# Patient Record
Sex: FEMALE | Race: WHITE | NOT HISPANIC OR LATINO | URBAN - METROPOLITAN AREA
[De-identification: names, ages, dates, MRNs, and addresses within clinical notes are randomized per-mention and may not be internally consistent; named-entity substitution may affect disease eponyms.]

---

## 2019-01-29 ENCOUNTER — EMERGENCY (EMERGENCY)
Facility: HOSPITAL | Age: 79
LOS: 1 days | Discharge: HOME | End: 2019-01-29
Attending: EMERGENCY MEDICINE | Admitting: EMERGENCY MEDICINE
Payer: MEDICARE

## 2019-01-29 VITALS
RESPIRATION RATE: 16 BRPM | SYSTOLIC BLOOD PRESSURE: 100 MMHG | OXYGEN SATURATION: 96 % | TEMPERATURE: 98 F | DIASTOLIC BLOOD PRESSURE: 66 MMHG | HEART RATE: 92 BPM

## 2019-01-29 PROCEDURE — 99282 EMERGENCY DEPT VISIT SF MDM: CPT | Mod: GC

## 2019-01-29 NOTE — ED PROVIDER NOTE - MEDICAL DECISION MAKING DETAILS
Normal eye exam; ddx dry eyes vs. marni-eye irritation; no corneal abrasions or foreign bodies appreciated, clinically not orbital cellulitis; no periorbital edema, systemic symptoms, periorbital tenderness, vision WNL, EOMI, SYLWIA, no significant erythema

## 2019-01-29 NOTE — ED PROVIDER NOTE - PHYSICAL EXAMINATION
Eyes: 20/20 vision b/l, EOMI, SYLWIA; no fluorescein uptake on exam; skin around eyes mildly red, left greater than right

## 2019-01-29 NOTE — ED ADULT NURSE NOTE - NSIMPLEMENTINTERV_GEN_ALL_ED
Implemented All Universal Safety Interventions:  [place] to call system. Call bell, personal items and telephone within reach. Instruct patient to call for assistance. Room bathroom lighting operational. Non-slip footwear when patient is off stretcher. Physically safe environment: no spills, clutter or unnecessary equipment. Stretcher in lowest position, wheels locked, appropriate side rails in place.

## 2019-01-29 NOTE — ED PROVIDER NOTE - PROGRESS NOTE DETAILS
Discussed pt's case and insurance with ED . Registration working with pt to navigate insurance matter. Recommended pt see psychiatry and/or psychology outpatient for her family interpersonal issues she described. Pt not a threat to self or others at this time based on extensive history taking. Emergency psych consult not indicated at this time. No SI, HI, hallucinations.

## 2019-01-29 NOTE — ED PROVIDER NOTE - NSFOLLOWUPCLINICS_GEN_ALL_ED_FT
A Psychiatrist  Psychiatry  .  [place]   Phone:   Fax:   Follow Up Time:     A Therapist  Psychiatry  .  [place]   Phone:   Fax:   Follow Up Time:     [facility]  Ophthalmology  [address], [address]st [place] [address]  Phone: [phone]  Fax:   Follow Up Time:     [facility]  Family Medicine  [address], [address]nd [place] [address]  Phone: [phone]  Fax:   Follow Up Time:

## 2019-01-29 NOTE — ED PROVIDER NOTE - NSFOLLOWUPINSTRUCTIONS_ED_ALL_ED_FT
Keeps your eyes moisturized with over the counter artificial tear solution as discussed in the emergency department during your visit. Follow up with a primary care doctor, ophthalmologist (eye doctor), and a psychiatrist and/or psychologist.

## 2019-01-29 NOTE — ED PROVIDER NOTE - OBJECTIVE STATEMENT
77 yo female with no pertinent PMH presents to the ED for b/l eye redness, left greater than right. States she first noted the skin around her left eye but no the eye itself was red about one month ago and then noted right marni-eye redness several days ago. Pt states financial difficulties and anxiety kept her from going to a doctor sooner. Denies blurry vision, eye pain, headache, fever, chills, trauma, known sick contacts. Pt does not wear corrective lenses or contacts and has not in the past. Of note, pt is labile in the ED and tangential. Denies SI, HI, hallucinations. States she is sad after her parents passed and has a poor relationship with her siblings but lives in an apartment, performs her own ADL's, feels safe at home.

## 2019-01-31 ENCOUNTER — APPOINTMENT (OUTPATIENT)
Dept: FAMILY MEDICINE | Facility: CLINIC | Age: 79
End: 2019-01-31

## 2019-01-31 ENCOUNTER — EMERGENCY (EMERGENCY)
Facility: HOSPITAL | Age: 79
LOS: 1 days | Discharge: HOME | End: 2019-01-31
Attending: EMERGENCY MEDICINE | Admitting: EMERGENCY MEDICINE
Payer: MEDICARE

## 2019-01-31 VITALS
TEMPERATURE: 98 F | OXYGEN SATURATION: 100 % | SYSTOLIC BLOOD PRESSURE: 121 MMHG | DIASTOLIC BLOOD PRESSURE: 70 MMHG | RESPIRATION RATE: 17 BRPM | HEART RATE: 75 BPM

## 2019-01-31 DIAGNOSIS — Z79.1 LONG TERM (CURRENT) USE OF NON-STEROIDAL ANTI-INFLAMMATORIES (NSAID): ICD-10-CM

## 2019-01-31 DIAGNOSIS — R41.0 DISORIENTATION, UNSPECIFIED: ICD-10-CM

## 2019-01-31 DIAGNOSIS — H57.89 OTHER SPECIFIED DISORDERS OF EYE AND ADNEXA: ICD-10-CM

## 2019-01-31 LAB
ALBUMIN SERPL ELPH-MCNC: 3.9 G/DL — SIGNIFICANT CHANGE UP (ref 3.4–5)
ALP SERPL-CCNC: 97 U/L — SIGNIFICANT CHANGE UP (ref 40–120)
ALT FLD-CCNC: 27 U/L — SIGNIFICANT CHANGE UP (ref 12–42)
ANION GAP SERPL CALC-SCNC: 9 MMOL/L — SIGNIFICANT CHANGE UP (ref 9–16)
AST SERPL-CCNC: 27 U/L — SIGNIFICANT CHANGE UP (ref 15–37)
BILIRUB SERPL-MCNC: 0.4 MG/DL — SIGNIFICANT CHANGE UP (ref 0.2–1.2)
BUN SERPL-MCNC: 20 MG/DL — SIGNIFICANT CHANGE UP (ref 7–23)
CALCIUM SERPL-MCNC: 8.9 MG/DL — SIGNIFICANT CHANGE UP (ref 8.5–10.5)
CHLORIDE SERPL-SCNC: 103 MMOL/L — SIGNIFICANT CHANGE UP (ref 96–108)
CO2 SERPL-SCNC: 28 MMOL/L — SIGNIFICANT CHANGE UP (ref 22–31)
CREAT SERPL-MCNC: 0.7 MG/DL — SIGNIFICANT CHANGE UP (ref 0.5–1.3)
GLUCOSE SERPL-MCNC: 89 MG/DL — SIGNIFICANT CHANGE UP (ref 70–99)
HCT VFR BLD CALC: 37.9 % — SIGNIFICANT CHANGE UP (ref 34.5–45)
HGB BLD-MCNC: 12.4 G/DL — SIGNIFICANT CHANGE UP (ref 11.5–15.5)
MAGNESIUM SERPL-MCNC: 1.9 MG/DL — SIGNIFICANT CHANGE UP (ref 1.6–2.6)
MCHC RBC-ENTMCNC: 29.3 PG — SIGNIFICANT CHANGE UP (ref 27–34)
MCHC RBC-ENTMCNC: 32.7 G/DL — SIGNIFICANT CHANGE UP (ref 32–36)
MCV RBC AUTO: 89.6 FL — SIGNIFICANT CHANGE UP (ref 80–100)
PLATELET # BLD AUTO: 241 K/UL — SIGNIFICANT CHANGE UP (ref 150–400)
POTASSIUM SERPL-MCNC: 4 MMOL/L — SIGNIFICANT CHANGE UP (ref 3.5–5.3)
POTASSIUM SERPL-SCNC: 4 MMOL/L — SIGNIFICANT CHANGE UP (ref 3.5–5.3)
PROT SERPL-MCNC: 7.3 G/DL — SIGNIFICANT CHANGE UP (ref 6.4–8.2)
RBC # BLD: 4.23 M/UL — SIGNIFICANT CHANGE UP (ref 3.8–5.2)
RBC # FLD: 13.2 % — SIGNIFICANT CHANGE UP (ref 10.3–14.5)
SODIUM SERPL-SCNC: 140 MMOL/L — SIGNIFICANT CHANGE UP (ref 132–145)
WBC # BLD: 4.9 K/UL — SIGNIFICANT CHANGE UP (ref 3.8–10.5)
WBC # FLD AUTO: 4.9 K/UL — SIGNIFICANT CHANGE UP (ref 3.8–10.5)

## 2019-01-31 PROCEDURE — 99284 EMERGENCY DEPT VISIT MOD MDM: CPT | Mod: 25

## 2019-01-31 PROCEDURE — 70450 CT HEAD/BRAIN W/O DYE: CPT | Mod: 26

## 2019-01-31 PROCEDURE — 93010 ELECTROCARDIOGRAM REPORT: CPT

## 2019-01-31 NOTE — ED ADULT TRIAGE NOTE - CHIEF COMPLAINT QUOTE
patient found wandering at [facility]. as per EMS patient nephew states she is increasing confused for 6 months. patient only complaining of redness to left eye.

## 2019-01-31 NOTE — ED PROVIDER NOTE - PHYSICAL EXAMINATION
CONSTITUTIONAL: Well-developed; well-nourished; in no acute distress.  	SKIN: Skin is warm and dry, no acute rash.  	HEAD: Normocephalic; atraumatic.  	EYES: PERRL, EOM intact; conjunctiva and sclera clear. VA 20/20 OS, OD, OU. +mild infraorbital erythema.  	ENT: No nasal discharge; airway clear.  no tonsillar swelling or exudates, uvula midline, airway patent  	NECK: Supple; non tender.  	CARD: S1, S2 normal; no murmurs, gallops, or rubs. Regular rate and rhythm.  	RESP: No wheezes, rales or rhonchi.  	ABD: soft; non-distended; non-tender  	EXT: Normal ROM x 4  	NEURO: Alert, oriented. Grossly unremarkable.  PSYCH: Cooperative, appropriate.

## 2019-01-31 NOTE — ED ADULT NURSE NOTE - OBJECTIVE STATEMENT
PT brought in by EMS after the patient was noted to be wandering in the hallway at her doctor's appt at [facility]. Pt A& O X 3.  Pt stating she is suspicious that her sisters are involved in getting her to the ED.

## 2019-01-31 NOTE — ED ADULT NURSE NOTE - CHIEF COMPLAINT QUOTE
patient found wandering at [facility] and ear [facility]. as per EMS patient nephew states she is increasing confused for 6 months. patient only complaining of redness to left eye.

## 2019-01-31 NOTE — ED PROVIDER NOTE - OBJECTIVE STATEMENT
78 y o female with no pertinent PMHX was BIBA for AMS. Pt was found wandering at [facility]. As per EMS, pt's nephew states that she has become increasingly confused for the past 6 months. Pt complaining of left eye redness. Was seen at [facility] 2 days ago for b/l eye redness, left greater than right. Pt was dc with PCP referral and opthalmology referral. 78 y o female with no pertinent PMHX was BIBEMS for evaluation.  Pt was found wandering at [facility]. seen at [facility] 2 days ago for b/l eye redness. Patient states she went to be evaluated for left infraorbital redness to the Eye specialist and after she was done, she was lost and didn't know how to get home and hence brought to ED for further evaluation.  Spoke with nephew Dwaine [phone] who is concerned that she is having difficulty taking care of herself at home. He states that Dr. Zhang, an eye doctor called him and as per Dwaine, that doctor is concerned for dementia and concern for having difficulty taking care of herself at home. He states she washes her dishes in the bathtub as the sink is broken and refuses to have anyone fix. He also states she is very Spiritism and the patient is under the impression that God talks to her. Sister Rosa [phone].    Patient appears to be very upset with her family after family feuds, she seems upset after her father passed away and her sister is takings all of her things.

## 2019-01-31 NOTE — ED PROVIDER NOTE - ATTENDING CONTRIBUTION TO CARE
Pt is a 77yo F who denies any PMH and was BIB EMS [place] [facility].  EMS reports they were called after she was apparently lost and couldn't safely get herself home.  Pt was here 2 days ago for some eye redness.  Went for follow-up today and couldn't get home.  Family is concerned for worsening dementia/forgetfullness.    ROS - as above.   PE - agree with PA exam as above.   A/P - Likely undiagnosed dementia.  Will perform w/u here and keep her on observation to see SW tomorrow.  Hopefully we can get out pt work up coordinated.

## 2019-02-01 VITALS
SYSTOLIC BLOOD PRESSURE: 107 MMHG | OXYGEN SATURATION: 98 % | DIASTOLIC BLOOD PRESSURE: 51 MMHG | HEART RATE: 818 BPM | TEMPERATURE: 97 F | RESPIRATION RATE: 16 BRPM

## 2019-02-01 LAB
APPEARANCE UR: CLEAR — SIGNIFICANT CHANGE UP
BILIRUB UR-MCNC: NEGATIVE — SIGNIFICANT CHANGE UP
COLOR SPEC: YELLOW — SIGNIFICANT CHANGE UP
DIFF PNL FLD: NEGATIVE — SIGNIFICANT CHANGE UP
GLUCOSE UR QL: NEGATIVE — SIGNIFICANT CHANGE UP
KETONES UR-MCNC: NEGATIVE — SIGNIFICANT CHANGE UP
LEUKOCYTE ESTERASE UR-ACNC: ABNORMAL
NITRITE UR-MCNC: NEGATIVE — SIGNIFICANT CHANGE UP
PH UR: 6 — SIGNIFICANT CHANGE UP (ref 5–8)
PROT UR-MCNC: NEGATIVE MG/DL — SIGNIFICANT CHANGE UP
SP GR SPEC: 1.01 — SIGNIFICANT CHANGE UP (ref 1–1.03)
UROBILINOGEN FLD QL: 0.2 E.U./DL — SIGNIFICANT CHANGE UP

## 2019-02-01 PROCEDURE — 99234 HOSP IP/OBS SM DT SF/LOW 45: CPT

## 2019-02-01 PROCEDURE — 71046 X-RAY EXAM CHEST 2 VIEWS: CPT | Mod: 26

## 2019-02-01 RX ORDER — ACETAMINOPHEN 500 MG
650 TABLET ORAL ONCE
Qty: 0 | Refills: 0 | Status: COMPLETED | OUTPATIENT
Start: 2019-02-01 | End: 2019-02-01

## 2019-02-01 RX ADMIN — Medication 650 MILLIGRAM(S): at 10:21

## 2019-02-01 RX ADMIN — Medication 1 MILLIGRAM(S): at 01:09

## 2019-02-01 NOTE — ED CDU PROVIDER INITIAL DAY NOTE - OBJECTIVE STATEMENT
78 y o female with no pertinent PMHX was BIBEMS for evaluation.  Pt was found wandering at [facility]. seen at [facility] 2 days ago for b/l eye redness. Patient states she went to be evaluated for left infraorbital redness to the Eye specialist and after she was done, she was lost and didn't know how to get home and hence brought to ED for further evaluation.  Spoke with nephew Dwaine [phone] who is concerned that she is having difficulty taking care of herself at home. He states that Dr. Zhang, an eye doctor called him and as per Dwaine, that doctor is concerned for dementia and concern for having difficulty taking care of herself at home. He states she washes her dishes in the bathtub as the sink is broken and refuses to have anyone fix. He also states she is very Adventist and the patient is under the impression that God talks to her. Sister Rosa [phone].    Patient appears to be very upset with her family after family feuds, she seems upset after her father passed away and her sister is takings all of her things. 78 y o female with no pertinent PMHX was BIBEMS for evaluation.  Pt was found wandering at [facility]. seen at [facility] 2 days ago for b/l eye redness. Patient states she went to be evaluated for left infraorbital redness to the Eye specialist and after she was done, she was lost and didn't know how to get home and hence brought to ED for further evaluation.  Spoke with nephew Dwaine [phone] who is concerned that she is having difficulty taking care of herself at home. He states that Dr. Zhang, an eye doctor called him and as per Dwaine, that doctor is concerned for dementia and concern for having difficulty taking care of herself at home. He states she washes her dishes in the bathtub as the sink is broken and refuses to have anyone fix. He also states she is very Oriental orthodox and the patient is under the impression that God talks to her. Sister Rosa [phone]. Dr. Keating [phone]    Patient appears to be very upset with her family after family feuds, she seems to be still upset about her father passing away and she thinks her sister is taking all of his things.

## 2019-02-01 NOTE — ED CDU PROVIDER DISPOSITION NOTE - NSFOLLOWUPINSTRUCTIONS_ED_ALL_ED_FT
Follow up as discussed with social work- see attached documents. Return to ER if symptoms worsen or return.

## 2019-02-01 NOTE — ED BEHAVIORAL HEALTH NOTE - BEHAVIORAL HEALTH NOTE
Sw was consulted to the Ed to speak with the patient. The patient is a 78year old female who was brought in from [facility] for wandering, getting lost and not being able to get home. Per the provider notes the patients Alannah Isidro is concerned for the patient as he got a call from a Dr. Zhang at the [facility] expressing his concern for the patient and he stated to the provider that it was expressed that the patient may have dementia. Per the notes Dwaine also stated that the patient washes the dishes in her bathtub because her sink is broken and she does not want any body to fix it and that she believes that God talk to her. The patient did present with some confusion but was able to state her birthday, her address, her phone number, where she was. There is a concern for the patients safety and level of confusion and ability to properly care for herself at home. An APS (Adult protective service) referral was put in via their online system. The Referral number is [id number]. Transportation via [employer] service was arranged for the patient and information was sent to the senior staff regarding the trip. The patient was very grateful for the transportation. Worker made available for any further assistance needed.

## 2019-02-01 NOTE — ED CDU PROVIDER INITIAL DAY NOTE - ATTENDING CONTRIBUTION TO CARE
Pt on observation to see social work in the AM.  Stable.  W/U benign.  Will hopefully be able to set her up with out pt f/u to w/u her worsening confusion/dementia.

## 2019-02-01 NOTE — ED CDU PROVIDER INITIAL DAY NOTE - PROGRESS NOTE DETAILS
Pt signed out to me at 8AM - I have seen and evaluated pt. aox3 but easily confused. Overnight showed signs of delirium and agitation likely 2/2 dementia. Will add CXR and UA to r/o infectious cause of delirium. SW will see at 9AM. Pt well appearing with normal vitals at [facility]. no medical complaint. pt aox3. calm, cooperative insightful. Ambulating around ED with steady gait. well appearing. Ate meal. Pt currently has decision making capacity. SW has spoken with her at length. Has outpatient appt with PCP and neuropsych. Will DC at this time. will arrange transport home.

## 2019-02-01 NOTE — ED ADULT NURSE REASSESSMENT NOTE - NS ED NURSE REASSESS COMMENT FT1
pt sitting up comfortably. will cont to monitor. safety maintianed
Pt becoming agitated about how and who made her come to the ER. Pt stating she felt as though she was "coerced" and worried about how she will be getting home. Pt reassured of her safety. Given PO Ativan as per orders. Pt also given dinner and PO fluids. TV turned on for patient. Pt currently eating dinner and watching tv.

## 2019-02-01 NOTE — ED CDU PROVIDER DISPOSITION NOTE - CLINICAL COURSE
Pt confused and lost in medical building after an appointment. Overnight found to have some sundowning. However, now aox3 with decision making capacity. Full workup including UA, CXR and labs WNL. Normal vitals. Will DC at this time. Will arrange transport. GIAN submitted ACS report and has set her up with PCP and neuropsych follow up

## 2019-02-02 DIAGNOSIS — H57.89 OTHER SPECIFIED DISORDERS OF EYE AND ADNEXA: ICD-10-CM

## 2019-02-02 DIAGNOSIS — Z79.1 LONG TERM (CURRENT) USE OF NON-STEROIDAL ANTI-INFLAMMATORIES (NSAID): ICD-10-CM

## 2019-02-04 ENCOUNTER — APPOINTMENT (OUTPATIENT)
Dept: FAMILY MEDICINE | Facility: CLINIC | Age: 79
End: 2019-02-04

## 2020-09-26 ENCOUNTER — INPATIENT (INPATIENT)
Facility: HOSPITAL | Age: 80
LOS: 5 days | Discharge: NURSING, REHAB OR LONG-TERM CARE | DRG: 884 | End: 2020-10-02
Attending: HOSPITALIST | Admitting: HOSPITALIST
Payer: MEDICARE

## 2020-09-26 VITALS
OXYGEN SATURATION: 97 % | RESPIRATION RATE: 18 BRPM | DIASTOLIC BLOOD PRESSURE: 57 MMHG | TEMPERATURE: 98 F | HEART RATE: 95 BPM | WEIGHT: 100.09 LBS | SYSTOLIC BLOOD PRESSURE: 127 MMHG | HEIGHT: 66 IN

## 2020-09-26 LAB — SARS-COV-2 RNA SPEC QL NAA+PROBE: SIGNIFICANT CHANGE UP

## 2020-09-26 PROCEDURE — 93010 ELECTROCARDIOGRAM REPORT: CPT

## 2020-09-26 PROCEDURE — 99285 EMERGENCY DEPT VISIT HI MDM: CPT | Mod: CS

## 2020-09-26 PROCEDURE — 71045 X-RAY EXAM CHEST 1 VIEW: CPT | Mod: 26

## 2020-09-26 PROCEDURE — 70450 CT HEAD/BRAIN W/O DYE: CPT | Mod: 26

## 2020-09-26 RX ORDER — SODIUM CHLORIDE 9 MG/ML
1000 INJECTION INTRAMUSCULAR; INTRAVENOUS; SUBCUTANEOUS ONCE
Refills: 0 | Status: COMPLETED | OUTPATIENT
Start: 2020-09-26 | End: 2020-09-26

## 2020-09-26 RX ADMIN — Medication 2 MILLIGRAM(S): at 19:14

## 2020-09-26 RX ADMIN — Medication 2 MILLIGRAM(S): at 22:15

## 2020-09-26 RX ADMIN — SODIUM CHLORIDE 1000 MILLILITER(S): 9 INJECTION INTRAMUSCULAR; INTRAVENOUS; SUBCUTANEOUS at 21:25

## 2020-09-26 NOTE — ED PROVIDER NOTE - PHYSICAL EXAMINATION
CONST: nontoxic NAD speaking in full although incoherent sentences  HEAD: atraumatic  EYES: conjunctivae clear, PERRL, EOMI  ENT: mmm  NECK: supple/FROM, no jvd  CARD: rrr no murmurs  CHEST: ctab no r/r/w, no stridor/retractions/tripoding  ABD: soft, nd, nttp, no rebound/guarding, no cvat  PELVIC: ***  EXT: FROM, symmetric distal pulses intact  SKIN: warm, dry, no rash, no pedal edema, cap refill <2sec  NEURO: a+ox***, CN II-XII intact, 5/5 strength x4, gross sensation intact x4, normal gait CONST: nontoxic NAD speaking in full although incoherent sentences  HEAD: atraumatic  EYES: conjunctivae clear, PERRL, EOMI  ENT: mmm  NECK: supple/FROM, no jvd  CARD: rrr no murmurs  CHEST: ctab no r/r/w, no stridor/retractions/tripoding  ABD: soft, nd, nttp, no rebound/guarding, no cvat  : no rash  EXT: FROM, symmetric distal pulses intact  SKIN: warm, dry, no rash, no pedal edema, cap refill <2sec  NEURO: a+ox2, CN II-XII intact, 5/5 strength x4, gross sensation intact x4, normal gait

## 2020-09-26 NOTE — ED PROVIDER NOTE - CLINICAL SUMMARY MEDICAL DECISION MAKING FREE TEXT BOX
avss. agitated. disorganized. no systemic sx. atraumatic. no acute focal neuro deficits. no leukocytosis vs significant anemia vs electrolyte abnl. ua neg. trop neg. ekg w/o acute abnl. cxr w/o acute focal consol vs ptx vs pulm edema. ct head w/o acute abnl. covid neg. will admit for placement given pt unable to care for self.

## 2020-09-26 NOTE — ED PROVIDER NOTE - PROGRESS NOTE DETAILS
pt does NOT have capacity pt does NOT have capacity. pt agitated and attempted to punch staff w/ fists. unable to verbally descalate despite quiet room. will give ativan 2.

## 2020-09-26 NOTE — ED PROVIDER NOTE - OBJECTIVE STATEMENT
80F poor historian, no known medical problems/no Rx, has not seen a pcp in >10y, bib friend who states pt is unable to care for self. when questioning pt, pt makes incoherant statements and contradicts herself (initially stated she had pain w/ urination and/or anal pain and one episode of watery brown diarrhea). per friend of reportedly >30y, pt has had insidious progressive decline in mentation but refuses to see a pcp. home is unlivable and pt is unable to shower/bathe herself. friend was paying diner tab to endure pt ate food, however diner states that he will get agitated, yell at customers, and most recently came in naked w/ a bathrobe barely covering her. friend states that pt has two elderly sisters who are unable to care for her and agreed w/ her bringing pt to hospital for placement. no fever/chills, no ha/dizziness, no uri/cough, no cp/sob, no abd pain/n/v, no hematochezia/melena, no dysuria, no rash, no prior covid, no trauma, no etoh. 80F poor historian, no known medical problems/no Rx, has not seen a pcp in >10y, bib friend who states pt is unable to care for self. when questioning pt, pt makes incoherent statements and contradicts herself (initially stated she had pain w/ urination and/or anal pain and one episode of watery brown diarrhea). per friend of reportedly >30y, pt has had insidious progressive decline in mentation but refuses to see a pcp. home is unlivable and pt is unable to shower/bathe herself. friend was paying diner tab to endure pt ate food, however diner states that he will get agitated, yell at customers, and most recently came in naked w/ a bathrobe barely covering her. friend states that pt has two elderly sisters who are unable to care for her and agreed w/ her bringing pt to hospital for placement. no fever/chills, no ha/dizziness, no uri/cough, no cp/sob, no abd pain/n/v, no hematochezia/melena, no dysuria, no rash, no prior covid, no trauma, no etoh.

## 2020-09-26 NOTE — ED ADULT NURSE NOTE - INTERVENTIONS DEFINITIONS
Provide visual cue, wrist band, yellow gown, etc./[place] to call system/Instruct patient to call for assistance/Physically safe environment: no spills, clutter or unnecessary equipment

## 2020-09-26 NOTE — ED ADULT NURSE NOTE - OBJECTIVE STATEMENT
pt is alert and oriented to name and place only. pts friend states she went to visit the patient at home today and noticed that her home is "unkept". states pt has been "complaining of lower back pain and going to the bathroom frequently today". as per friend, pt has dementia. pt denies any abd pain, cough, diarrhea, fever/chills, chest pain, sob. states "something brown keeps coming out". pt attempted to use the restroom twice in the ED but no urine or feces was noted.

## 2020-09-27 DIAGNOSIS — R62.7 ADULT FAILURE TO THRIVE: ICD-10-CM

## 2020-09-27 DIAGNOSIS — F03.91 UNSPECIFIED DEMENTIA WITH BEHAVIORAL DISTURBANCE: ICD-10-CM

## 2020-09-27 DIAGNOSIS — R74.8 ABNORMAL LEVELS OF OTHER SERUM ENZYMES: ICD-10-CM

## 2020-09-27 DIAGNOSIS — R63.8 OTHER SYMPTOMS AND SIGNS CONCERNING FOOD AND FLUID INTAKE: ICD-10-CM

## 2020-09-27 LAB
ANION GAP SERPL CALC-SCNC: 8 MMOL/L — SIGNIFICANT CHANGE UP (ref 5–17)
BASOPHILS # BLD AUTO: 0.03 K/UL — SIGNIFICANT CHANGE UP (ref 0–0.2)
BASOPHILS NFR BLD AUTO: 0.7 % — SIGNIFICANT CHANGE UP (ref 0–2)
BUN SERPL-MCNC: 16 MG/DL — SIGNIFICANT CHANGE UP (ref 7–23)
CALCIUM SERPL-MCNC: 8.8 MG/DL — SIGNIFICANT CHANGE UP (ref 8.4–10.5)
CHLORIDE SERPL-SCNC: 106 MMOL/L — SIGNIFICANT CHANGE UP (ref 96–108)
CO2 SERPL-SCNC: 26 MMOL/L — SIGNIFICANT CHANGE UP (ref 22–31)
CREAT SERPL-MCNC: 0.62 MG/DL — SIGNIFICANT CHANGE UP (ref 0.5–1.3)
EOSINOPHIL # BLD AUTO: 0.13 K/UL — SIGNIFICANT CHANGE UP (ref 0–0.5)
EOSINOPHIL NFR BLD AUTO: 2.8 % — SIGNIFICANT CHANGE UP (ref 0–6)
FOLATE SERPL-MCNC: 5.4 NG/ML — SIGNIFICANT CHANGE UP
GLUCOSE SERPL-MCNC: 85 MG/DL — SIGNIFICANT CHANGE UP (ref 70–99)
HCT VFR BLD CALC: 36.5 % — SIGNIFICANT CHANGE UP (ref 34.5–45)
HGB BLD-MCNC: 11.7 G/DL — SIGNIFICANT CHANGE UP (ref 11.5–15.5)
IMM GRANULOCYTES NFR BLD AUTO: 0.2 % — SIGNIFICANT CHANGE UP (ref 0–1.5)
LYMPHOCYTES # BLD AUTO: 1.96 K/UL — SIGNIFICANT CHANGE UP (ref 1–3.3)
LYMPHOCYTES # BLD AUTO: 42.6 % — SIGNIFICANT CHANGE UP (ref 13–44)
MAGNESIUM SERPL-MCNC: 2 MG/DL — SIGNIFICANT CHANGE UP (ref 1.6–2.6)
MCHC RBC-ENTMCNC: 29.6 PG — SIGNIFICANT CHANGE UP (ref 27–34)
MCHC RBC-ENTMCNC: 32.1 GM/DL — SIGNIFICANT CHANGE UP (ref 32–36)
MCV RBC AUTO: 92.4 FL — SIGNIFICANT CHANGE UP (ref 80–100)
MONOCYTES # BLD AUTO: 0.4 K/UL — SIGNIFICANT CHANGE UP (ref 0–0.9)
MONOCYTES NFR BLD AUTO: 8.7 % — SIGNIFICANT CHANGE UP (ref 2–14)
NEUTROPHILS # BLD AUTO: 2.07 K/UL — SIGNIFICANT CHANGE UP (ref 1.8–7.4)
NEUTROPHILS NFR BLD AUTO: 45 % — SIGNIFICANT CHANGE UP (ref 43–77)
NRBC # BLD: 0 /100 WBCS — SIGNIFICANT CHANGE UP (ref 0–0)
PHOSPHATE SERPL-MCNC: 2.9 MG/DL — SIGNIFICANT CHANGE UP (ref 2.5–4.5)
PLATELET # BLD AUTO: 215 K/UL — SIGNIFICANT CHANGE UP (ref 150–400)
POTASSIUM SERPL-MCNC: 3.6 MMOL/L — SIGNIFICANT CHANGE UP (ref 3.5–5.3)
POTASSIUM SERPL-SCNC: 3.6 MMOL/L — SIGNIFICANT CHANGE UP (ref 3.5–5.3)
RBC # BLD: 3.95 M/UL — SIGNIFICANT CHANGE UP (ref 3.8–5.2)
RBC # FLD: 12.8 % — SIGNIFICANT CHANGE UP (ref 10.3–14.5)
SODIUM SERPL-SCNC: 140 MMOL/L — SIGNIFICANT CHANGE UP (ref 135–145)
TSH SERPL-MCNC: 0.99 UIU/ML — SIGNIFICANT CHANGE UP (ref 0.35–4.94)
VIT B12 SERPL-MCNC: 477 PG/ML — SIGNIFICANT CHANGE UP (ref 232–1245)
WBC # BLD: 4.6 K/UL — SIGNIFICANT CHANGE UP (ref 3.8–10.5)
WBC # FLD AUTO: 4.6 K/UL — SIGNIFICANT CHANGE UP (ref 3.8–10.5)

## 2020-09-27 PROCEDURE — 99233 SBSQ HOSP IP/OBS HIGH 50: CPT | Mod: GC

## 2020-09-27 PROCEDURE — 93010 ELECTROCARDIOGRAM REPORT: CPT

## 2020-09-27 RX ORDER — HALOPERIDOL DECANOATE 100 MG/ML
1 INJECTION INTRAMUSCULAR ONCE
Refills: 0 | Status: COMPLETED | OUTPATIENT
Start: 2020-09-27 | End: 2020-09-27

## 2020-09-27 RX ORDER — OLANZAPINE 15 MG/1
5 TABLET, FILM COATED ORAL ONCE
Refills: 0 | Status: COMPLETED | OUTPATIENT
Start: 2020-09-27 | End: 2020-09-27

## 2020-09-27 RX ORDER — POTASSIUM CHLORIDE 20 MEQ
40 PACKET (EA) ORAL ONCE
Refills: 0 | Status: COMPLETED | OUTPATIENT
Start: 2020-09-27 | End: 2020-09-27

## 2020-09-27 RX ORDER — ENOXAPARIN SODIUM 100 MG/ML
40 INJECTION SUBCUTANEOUS EVERY 24 HOURS
Refills: 0 | Status: DISCONTINUED | OUTPATIENT
Start: 2020-09-27 | End: 2020-10-02

## 2020-09-27 RX ORDER — THIAMINE MONONITRATE (VIT B1) 100 MG
100 TABLET ORAL DAILY
Refills: 0 | Status: DISCONTINUED | OUTPATIENT
Start: 2020-09-27 | End: 2020-09-29

## 2020-09-27 RX ORDER — HALOPERIDOL DECANOATE 100 MG/ML
2 INJECTION INTRAMUSCULAR ONCE
Refills: 0 | Status: COMPLETED | OUTPATIENT
Start: 2020-09-27 | End: 2020-09-27

## 2020-09-27 RX ADMIN — HALOPERIDOL DECANOATE 2 MILLIGRAM(S): 100 INJECTION INTRAMUSCULAR at 18:17

## 2020-09-27 RX ADMIN — ENOXAPARIN SODIUM 40 MILLIGRAM(S): 100 INJECTION SUBCUTANEOUS at 09:16

## 2020-09-27 RX ADMIN — OLANZAPINE 5 MILLIGRAM(S): 15 TABLET, FILM COATED ORAL at 18:45

## 2020-09-27 RX ADMIN — Medication 1 TABLET(S): at 14:22

## 2020-09-27 RX ADMIN — Medication 100 MILLIGRAM(S): at 14:23

## 2020-09-27 RX ADMIN — HALOPERIDOL DECANOATE 1 MILLIGRAM(S): 100 INJECTION INTRAMUSCULAR at 15:01

## 2020-09-27 RX ADMIN — Medication 40 MILLIEQUIVALENT(S): at 14:22

## 2020-09-27 NOTE — CHART NOTE - NSCHARTNOTEFT_GEN_A_CORE
Admitting Diagnosis:   Patient is a 80y old  Female who presents with a chief complaint of failure to thrive (27 Sep 2020 01:39)      Consult: Yes [ x  ]  No [   ]    Reason for Initial Nutrition Assessment:  BMI 16  FTT    PAST MEDICAL & SURGICAL HISTORY:  Dementia with behavioral problem    No significant past surgical history        Current Nutrition Order:  NPO except meds  *adv. pending formal swallow eval    PO Intake: Excellent (%) [   ]  Good (50-75%) [   ]  Fair (25-50%) [   ]  Poor (<25%) [   ] N/A    GI Issues:   No apparent GI distress  +BM this am  Not on bowel regimen    Pain:  No pain reported  Appears comfortable    Skin Integrity:  Cm score 14  Blanchable redness    Labs:   09-27    140  |  106  |  16  ----------------------------<  85  3.6   |  26  |  0.62    Ca    8.8      27 Sep 2020 07:36  Phos  2.9     09-27  Mg     2.0     09-27    TPro  7.3  /  Alb  4.2  /  TBili  0.4  /  DBili  x   /  AST  28  /  ALT  14  /  AlkPhos  86  09-26    CAPILLARY BLOOD GLUCOSE        Nutritionally Pertinent Lab Values:  lipase 71    Medications:  MEDICATIONS  (STANDING):  enoxaparin Injectable 40 milliGRAM(s) SubCutaneous every 24 hours    MEDICATIONS  (PRN):  haloperidol    Injectable 1 milliGRAM(s) IV Push once PRN agitation      Admitted Anthropometrics:  Height: 5'6", IBW 130lbs+/-10%, %IBW 77%, BMI 16.2     Weight: 100lbs  Daily Height in cm: 167.64 (26 Sep 2020 17:13)    Daily     Weight Change:   Unable to obtain in-depth diet/weight history 2/2 dementia however pt reported UBW is "120 dollars". Current BW is 100lbs.  Nutrition Focused Physical Exam: Completed [ x  ]  Unable to complete [   ]  Current admitted wt is 100lbs. Pt noted to have moderate to severe muscle wasting most prominent in clavicular and pecoral regions w/ loose skin folds noted in biceps/triceps regions. D/t chronic dementia, it is likely pt requires orientation to food throughout day and it is unclear if she receives that type of support at home. See malnutrition sticker placed today 9/27    Estimated energy needs:   IBW (59kg) used for calculations as pt <80% of IBW   Nutrient needs based on [facility] standards of care for maintenance in older adults.   Needs adjusted for age, FTT, suspected moderate PCM  1475-1772kcal/day (25-30kcal/kg IBW)  71-83g pro/day (1.2-1.4g pro/kg IBW)  Fluids per team    Subjective:   79yo F poor historian, with no known medical history with exception of dementia, last saw PCP 10 years ago, not on any medication, brought in to the ED by friend for failure to thrive and acute on chronic dementia w/ behavioral disturbance.    Nutrition consulted for BMI and FTT. Pt seen in room, asleep in bed, arousable to verbal stimuli. Currently NPO, advancement pending formal swallow eval. Pt A&Ox1-2, limited subjective information obtained. Inquired about usual body weight to which patient reported "120 dollars". Current admitted wt is 100lbs. Pt noted to have moderate to severe muscle wasting most prominent in clavicular and pectoral regions w/ loose skin folds noted in biceps/triceps regions. D/t chronic dementia, it is likely pt requires orientation to food throughout day and it is unclear if she receives that type of support at home. SW consulted for appropriate placement after discharge. No food allergies noted in EMR. +BM 9/27. Skin intact pressure wise, cm score 14. Recommend adding on MVI and thiamine. Advance to regular diet w/ EnsureEnlive TID (1050kcal, 60g pro) w/ consistency per SLP. RD to follow.     Nutrition Diagnosis:  suspected moderate PCM in the context of environmental circumstances 2/2 dementia w/ behavioral disturbances RT suspected inadequate intake AEB BMI 16.2, visible signs muscle wasting fat loss, and suspected weight loss    Goal:  Pt to consistently meet % of estimated needs PO and avoid further s/s malnutrition    Recommendations:  1. Recommend advancing diet to regular w/ ensure enlive TID (1050kcal, 60g pro) w/ consistency per SLP recs  2. MVI and thiamine daily  3. Please obtain updated weights for accuracy and trending  4. SW consult to assess for most appropriate placement after d/c as pt will need assistance w/ food procurement/preparation/encouragement  *d/w team    Education:   N/A AMS    Risk Level: High [  x ] Moderate [   ] Low [   ]

## 2020-09-27 NOTE — SWALLOW BEDSIDE ASSESSMENT ADULT - SLP PERTINENT HISTORY OF CURRENT PROBLEM
Poor historian, with no known medical history with exception of dementia, last saw PCP 10 years ago, not on any medication, brought in to the ED by friend for failure to thrive and acute on chronic dementia w/ behavioral disturbance.

## 2020-09-27 NOTE — CHART NOTE - TREATMENT: THE FOLLOWING DIET HAS BEEN RECOMMENDED
1. Recommend advancing diet to regular w/ ensure enlive TID (1050kcal, 60g pro) w/ consistency per SLP recs  2. MVI and thiamine daily  3. Please obtain updated weights for accuracy and trending  4. SW consult to assess for most appropriate placement after d/c as pt will need assistance w/ food procurement/preparation/encouragement  *paged team to discuss    Findings:  Inquired about usual body weight to which patient reported "120 dollars". Current admitted wt is 100lbs. Pt noted to have moderate to severe muscle wasting most prominent in clavicular and pectoral regions w/ loose skin folds noted in biceps/triceps regions. D/t chronic dementia, it is likely pt requires orientation to food throughout day and it is unclear if she receives that type of support at home.

## 2020-09-27 NOTE — PROVIDER CONTACT NOTE (OTHER) - SITUATION
Pt climbed out of bed, over the bed rail, bed alarm was on, however. pt on floor by the time nursing staff arrived.

## 2020-09-27 NOTE — H&P ADULT - ASSESSMENT
80F, poor historian, with no known medical history with exception of dementia, last saw PCP 10 years ago, not on any medication, brought in to the ED by friend for failure to thrive and acute on chronic dementia w/ behavioral disturbance.

## 2020-09-27 NOTE — CHART NOTE - NSCHARTNOTEFT_GEN_A_CORE
Post Fall Assessment    Name of  Notified: Alessia Olivia    Name of attending notified: Dr. Ibarra      Notified by Rn for patient s/p mechanical fall. Pt seen and examined at bedside.   Pt states did/did not hit head.   Pt has no complaints at this time.   Denies SOB, CP, palpitations, dizziness, LOC, hip / back pain.       Recent Vital Signs:       Physical Exam  General: NAD, resting comfortably in chair  Mental Status: A&O x3  Skin: Warm, dry, no rashes / lesions  Head: NCAT  Neuro: Non focal  Cardiac: S1/S2. No murmus appreciated  Lungs: CTA b/l. No rales, wheezes, rhonchi appreciated b/l.   Abdomen: Soft, +BS, non distended  Extremities: No edema. Full ROM all 4 extremities.     Pt ambulating appropriately with no vertebral / hip tenderness.                 Imaging ordered STAT:    CT Head:      Medication Review:  MEDICATIONS  (STANDING):      MEDICATIONS  (PRN):                  HPI:       1) _____fall  - CT head ordered to r/o acute bleed. Preliminary findings   - Bed alarm  - fall precautions  - enhanced supervision   - case discussed with   - Will discuss case with primary team in     Briseida Flores PA-C  Department of Medicine POST FALL ASSESSMENT    Primary  Notified  [ X] Yes:  Name of  spoken to:  [Alessia Olivia              ]   [ ] No:  If no, explain [                                                             ]     Attending Notified (Required):   [X ] Yes.  Name of Physician: [        Dr. Laura Ibarra                                   ]   [ ] No.   If no, explain:  [                                                    ]     Physical Exam:    BP:  [126/61  ]          Pulse:  [ 61        ]               Pulse Ox:  [96  %]     RR:  [17          ]                 Temperature:  [ 97.8            ]   Findings:  [No overt signs of trauma                                                      ]     Imaging Ordered STAT:  [X} CT Head    [  ] X-ray:  [                               ]       [  ] Other:  [                      ]    [  ] None    Medication Review  [  ] Anesthetic                                                   [  ] Digoxin                                                           [  ] Antidepressant  [  ] Diuretic                                                        [  ] Antihistamine                                                [  ] Hypoglycemic  [  ] Anti-hypertensive                                      [  ] Narcotic                                                          [  ] Anti-seizure  [  ] NSAID                                                           [  ] Anticoagulant                                                [  ] Psychotropic  [  ] Antiplatelet (e.g. aspirin, clopidogrel)    [  ] Sedative/Hypnotic                                        [X  ] Benzodiazepine (patient had received 4mg Ativan in ED)  [  ] Cathartic / Laxative                                    [  ] Patient currently on 6 or more meds    Adjustment Made to Medication:  [   ] Yes   [  ] No       If yes, please explain [                                                                      ]                     Imaging results reviewed:  Date/Time:  [                                         ]      Findings:  [  ] Negative  [  ]  Positive        If findings:  Explain (and enter progress note):  [                                               ]

## 2020-09-27 NOTE — SWALLOW BEDSIDE ASSESSMENT ADULT - SLP GENERAL OBSERVATIONS
Pt received awake and alert in bed, pleasant and confused. Pt followed 1-step directives inconsistently.

## 2020-09-27 NOTE — H&P ADULT - NSHPPHYSICALEXAM_GEN_ALL_CORE
VITAL SIGNS:  T(C): 37.1 (09-27-20 @ 02:39), Max: 37.1 (09-27-20 @ 02:39)  T(F): 98.8 (09-27-20 @ 02:39), Max: 98.8 (09-27-20 @ 02:39)  HR: 64 (09-27-20 @ 02:39) (64 - 95)  BP: 142/61 (09-27-20 @ 02:39) (122/65 - 142/61)  BP(mean): --  RR: 18 (09-27-20 @ 02:39) (18 - 18)  SpO2: 97% (09-27-20 @ 02:39) (97% - 100%)  Wt(kg): --    PHYSICAL EXAM:    Constitutional: lethargic appearing, chronically ill, no acute distress  Head: Nc/At  Eyes: PERRL, EOMI, clear conjunctiva  ENT: no nasal discharge; uvula midline, no oropharyngeal erythema or exudates; dry mucus membranes  Neck: supple; no JVD or thyromegaly  Respiratory: CTA b/l, no wheezes, rales, or rhonchi  Cardiac: +S1/S2, +RRR, no murmurs, rubs, or gallops  Gastrointestinal: soft, non-tender, non-distended, no rebound/guarding, no palpable masses, normoactive bowel sounds x4  Extremities: WWP, no clubbing or cyanosis, no peripheral edema  Musculoskeletal: NROM x4; no joint swelling, tenderness or erythema  Vascular: 2+ radial and DP pulses b/l  Dermatologic: skin warm, dry and intact, no rashes, wounds, or scars  Lymphatic: no submandibular or cervical LAD  Neurologic: unable to assess full neuro exam 2/2 mental status, somnolent, opens eyes to verbal stimuli, moves all 4 extremities spontaneously

## 2020-09-27 NOTE — H&P ADULT - HISTORY OF PRESENT ILLNESS
In the ED:  Initial vital signs: T: XX F, HR: XX, BP: XX, R: XX, SpO2: XX% on RA  ED course:   Labs: significant for  Imaging:  CXR:   EKG:   Medications:   Consults: none 80F, poor historian, with no known medical history, last saw PCP 10 years ago, not on any medication, brought in to the ED by friend who states patient has been having a progressive cognitive decline and failure to thrive, however patient now is in a situation where her home is unlivable and not able to take care of herself. Unable to obtain history from patient, as very lethargic and incoherent, after receiving 4 mg Ativan in the ED. Per chart review, patient was very disorganized, agitated, and did not make any coherent sentences. Per friend, patient has been unable to shower/bathe herself. Friend has been paying for her tab as she was worried about her po intake.     In the ED:  Initial vital signs: T: XX F, HR: XX, BP: XX, R: XX, SpO2: XX% on RA  ED course:   Labs: significant for  Imaging:  CXR:   EKG:   Medications:   Consults: none 80F, poor historian, with no known medical history with exception of dementia, last saw PCP 10 years ago, not on any medication, brought in to the ED by friend who states patient has been having a progressive cognitive decline and failure to thrive, however patient now is in a situation where her home is unlivable and not able to take care of herself. Today, patient was found to be naked with a bathrobe barely covering herself in a diner. She went on to yell at customers and diner employee called patient's friend, who ultimately brought her to the ED. Per chart review, patient refuses to see PCP.     Unable to obtain history from patient, as very lethargic and incoherent, after receiving 4 mg Ativan in the ED. Per chart review, patient was very disorganized, agitated, and did not make any coherent sentences.     In the ED: Initial vital signs: T: 98.4 F, HR: 95, BP: 127/57, R: 18, SpO2: 97% on RA  ED course: s/p 2mg Ativan x2, 1L NS  Labs: normal cbc/bmp, lipase elevated 71. UA negative, COVID negative   Imaging: CT head: no acute intracranial pathology, severe chronic microvascular disease   CXR: No acute infiltrate or effusions   EKG: NSR HR 80s, no acute ST or T wave changes   Medications:  none   Consults: none 80F, poor historian, with no known medical history with exception of dementia, last saw PCP 10 years ago, not on any medication, brought in to the ED by friend who states patient has been having a progressive cognitive decline and failure to thrive, however patient now is in a situation where her home is unlivable and not able to take care of herself. Today, patient was found to be naked with a bathrobe barely covering herself in a diner. She went on to yell at customers and diner employee called patient's friend, who ultimately brought her to the ED. Per chart review, patient refuses to see PCP.  Unable to obtain history from patient, as very lethargic and incoherent, after receiving 4 mg Ativan in the ED. Per chart review, patient was very disorganized, agitated, and did not make any coherent sentences. ROS unable to obtain.     In the ED: Initial vital signs: T: 98.4 F, HR: 95, BP: 127/57, R: 18, SpO2: 97% on RA  ED course: s/p 2mg Ativan x2, 1L NS  Labs: normal cbc/bmp, lipase elevated 71. UA negative, COVID negative   Imaging: CT head: no acute intracranial pathology, severe chronic microvascular disease   CXR: No acute infiltrate or effusions   EKG: NSR HR 80s, no acute ST or T wave changes   Medications:  none   Consults: none

## 2020-09-27 NOTE — H&P ADULT - PROBLEM SELECTOR PLAN 4
F: No IVF  E: Replete PRN  N: NPO (pt still altered)  DVT Ppx: Lovenox  Code; FULL CODE   Dispo: [facility]

## 2020-09-27 NOTE — SWALLOW BEDSIDE ASSESSMENT ADULT - SWALLOW EVAL: DIAGNOSIS
Pt presents w/ a functional oropharyngeal swallow. Recs for soft vs regular solids per pt preference, not 2/2 dysphagia. Aspiration precautions in place given poor cognitive status. No further SLP intervention warranted. Please re-consult with any concern or change in status.

## 2020-09-27 NOTE — H&P ADULT - PROBLEM SELECTOR PLAN 2
Pt with longstanding history of dementia, in the ED, was very agitated and incoherent.   - f/u RPR, B12, folate, TSH  - consider Psych consult in AM for management   - avoid benzodiazepines, consider IV Haldol, Seroquel, or IM Zyprexa

## 2020-09-27 NOTE — H&P ADULT - NSHPLABSRESULTS_GEN_ALL_CORE
12.4   5.68  )-----------( 276      ( 26 Sep 2020 18:26 )             38.4       09-26    141  |  104  |  15  ----------------------------<  120<H>  3.9   |  26  |  0.70    Ca    9.4      26 Sep 2020 18:26    TPro  7.3  /  Alb  4.2  /  TBili  0.4  /  DBili  x   /  AST  28  /  ALT  14  /  AlkPhos  86  09-26              Urinalysis Basic - ( 26 Sep 2020 22:33 )    Color: Yellow / Appearance: Clear / SG: >=1.030 / pH: x  Gluc: x / Ketone: NEGATIVE  / Bili: Negative / Urobili: 0.2 E.U./dL   Blood: x / Protein: NEGATIVE mg/dL / Nitrite: NEGATIVE   Leuk Esterase: NEGATIVE / RBC: x / WBC x   Sq Epi: x / Non Sq Epi: x / Bacteria: x        PT/INR - ( 26 Sep 2020 18:26 )   PT: 12.0 sec;   INR: 1.00          PTT - ( 26 Sep 2020 18:26 )  PTT:29.5 sec    Lactate Trend      CARDIAC MARKERS ( 26 Sep 2020 18:26 )  x     / <0.01 ng/mL / x     / x     / x            CAPILLARY BLOOD GLUCOSE

## 2020-09-27 NOTE — H&P ADULT - NSHPSOCIALHISTORY_GEN_ALL_CORE
Tobacco use:  n/a  EtOH use: n/a  Illicit drug use: n/a    Living situation: Lives at home alone, no HHA

## 2020-09-27 NOTE — H&P ADULT - PROBLEM SELECTOR PLAN 1
Pt unable to take care of self, likely 2/2 progressive worsening cognitive decline/dementia. Pt unable to take care of self, likely 2/2 progressive worsening cognitive decline/dementia. Pt appears malnourished and unkempt. No focal neuro deficits on exam, CT head negative.   - PT consult- will likely need EDWARD  - CM/SW in AM  - Speech/swallow and Nutrition consult in AM
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
## 2020-09-28 DIAGNOSIS — F03.91 UNSPECIFIED DEMENTIA WITH BEHAVIORAL DISTURBANCE: ICD-10-CM

## 2020-09-28 LAB
ANION GAP SERPL CALC-SCNC: 13 MMOL/L — SIGNIFICANT CHANGE UP (ref 5–17)
BUN SERPL-MCNC: 10 MG/DL — SIGNIFICANT CHANGE UP (ref 7–23)
CALCIUM SERPL-MCNC: 9.5 MG/DL — SIGNIFICANT CHANGE UP (ref 8.4–10.5)
CHLORIDE SERPL-SCNC: 101 MMOL/L — SIGNIFICANT CHANGE UP (ref 96–108)
CO2 SERPL-SCNC: 25 MMOL/L — SIGNIFICANT CHANGE UP (ref 22–31)
CREAT SERPL-MCNC: 0.62 MG/DL — SIGNIFICANT CHANGE UP (ref 0.5–1.3)
GLUCOSE SERPL-MCNC: 122 MG/DL — HIGH (ref 70–99)
HCT VFR BLD CALC: 44.4 % — SIGNIFICANT CHANGE UP (ref 34.5–45)
HGB BLD-MCNC: 14.4 G/DL — SIGNIFICANT CHANGE UP (ref 11.5–15.5)
MAGNESIUM SERPL-MCNC: 1.9 MG/DL — SIGNIFICANT CHANGE UP (ref 1.6–2.6)
MCHC RBC-ENTMCNC: 29.3 PG — SIGNIFICANT CHANGE UP (ref 27–34)
MCHC RBC-ENTMCNC: 32.4 GM/DL — SIGNIFICANT CHANGE UP (ref 32–36)
MCV RBC AUTO: 90.2 FL — SIGNIFICANT CHANGE UP (ref 80–100)
NRBC # BLD: 0 /100 WBCS — SIGNIFICANT CHANGE UP (ref 0–0)
PLATELET # BLD AUTO: 242 K/UL — SIGNIFICANT CHANGE UP (ref 150–400)
POTASSIUM SERPL-MCNC: 3.8 MMOL/L — SIGNIFICANT CHANGE UP (ref 3.5–5.3)
POTASSIUM SERPL-SCNC: 3.8 MMOL/L — SIGNIFICANT CHANGE UP (ref 3.5–5.3)
RBC # BLD: 4.92 M/UL — SIGNIFICANT CHANGE UP (ref 3.8–5.2)
RBC # FLD: 13 % — SIGNIFICANT CHANGE UP (ref 10.3–14.5)
SODIUM SERPL-SCNC: 139 MMOL/L — SIGNIFICANT CHANGE UP (ref 135–145)
T PALLIDUM AB TITR SER: NEGATIVE — SIGNIFICANT CHANGE UP
WBC # BLD: 10.28 K/UL — SIGNIFICANT CHANGE UP (ref 3.8–10.5)
WBC # FLD AUTO: 10.28 K/UL — SIGNIFICANT CHANGE UP (ref 3.8–10.5)

## 2020-09-28 PROCEDURE — 99221 1ST HOSP IP/OBS SF/LOW 40: CPT

## 2020-09-28 PROCEDURE — 99233 SBSQ HOSP IP/OBS HIGH 50: CPT | Mod: GC

## 2020-09-28 PROCEDURE — 70450 CT HEAD/BRAIN W/O DYE: CPT | Mod: 26

## 2020-09-28 RX ORDER — OLANZAPINE 15 MG/1
5 TABLET, FILM COATED ORAL ONCE
Refills: 0 | Status: COMPLETED | OUTPATIENT
Start: 2020-09-28 | End: 2020-09-28

## 2020-09-28 RX ORDER — QUETIAPINE FUMARATE 200 MG/1
50 TABLET, FILM COATED ORAL AT BEDTIME
Refills: 0 | Status: DISCONTINUED | OUTPATIENT
Start: 2020-09-28 | End: 2020-09-29

## 2020-09-28 RX ORDER — OLANZAPINE 15 MG/1
2.5 TABLET, FILM COATED ORAL EVERY 6 HOURS
Refills: 0 | Status: DISCONTINUED | OUTPATIENT
Start: 2020-09-28 | End: 2020-09-30

## 2020-09-28 RX ORDER — MAGNESIUM SULFATE 500 MG/ML
2 VIAL (ML) INJECTION ONCE
Refills: 0 | Status: DISCONTINUED | OUTPATIENT
Start: 2020-09-28 | End: 2020-09-28

## 2020-09-28 RX ORDER — QUETIAPINE FUMARATE 200 MG/1
12.5 TABLET, FILM COATED ORAL AT BEDTIME
Refills: 0 | Status: DISCONTINUED | OUTPATIENT
Start: 2020-09-28 | End: 2020-09-28

## 2020-09-28 RX ORDER — POTASSIUM CHLORIDE 20 MEQ
20 PACKET (EA) ORAL ONCE
Refills: 0 | Status: COMPLETED | OUTPATIENT
Start: 2020-09-28 | End: 2020-09-28

## 2020-09-28 RX ORDER — OLANZAPINE 15 MG/1
5 TABLET, FILM COATED ORAL EVERY 24 HOURS
Refills: 0 | Status: DISCONTINUED | OUTPATIENT
Start: 2020-09-28 | End: 2020-09-28

## 2020-09-28 RX ADMIN — OLANZAPINE 5 MILLIGRAM(S): 15 TABLET, FILM COATED ORAL at 01:33

## 2020-09-28 RX ADMIN — Medication 100 MILLIGRAM(S): at 10:16

## 2020-09-28 RX ADMIN — Medication 20 MILLIEQUIVALENT(S): at 13:30

## 2020-09-28 RX ADMIN — Medication 1 TABLET(S): at 10:16

## 2020-09-28 RX ADMIN — ENOXAPARIN SODIUM 40 MILLIGRAM(S): 100 INJECTION SUBCUTANEOUS at 05:34

## 2020-09-28 RX ADMIN — OLANZAPINE 5 MILLIGRAM(S): 15 TABLET, FILM COATED ORAL at 11:44

## 2020-09-28 RX ADMIN — QUETIAPINE FUMARATE 50 MILLIGRAM(S): 200 TABLET, FILM COATED ORAL at 21:50

## 2020-09-28 NOTE — BEHAVIORAL HEALTH ASSESSMENT NOTE - OTHER
unable to assess Patient spoke of a tall man named Rudy whom she had recently started dating and stated that she recently started developing a building with her friend, Esme. Patient did not answer questions regarding this, but did not appear to be experiencing hallucinations at the time of the visit. Patient spoke of multiple men being present in her home prior to coming to the hospital. Unsure if this transpired. Unable to assess unable to assess as patient was constrained

## 2020-09-28 NOTE — PROGRESS NOTE ADULT - PROBLEM SELECTOR PLAN 1
Pt unable to take care of self, likely 2/2 progressive worsening cognitive decline/dementia. Pt appears malnourished and unkempt. No focal neuro deficits on exam, CT head negative.  - f/u PT consult- will likely need EDWARD  - f/u CM/SW  - bedside swallow rec: mech soft w/ thin liquids  - Nutrition: recommend advancing diet to regular w/ ensure enlive TID w/ consistency per SLP recs, MVI, thiamine

## 2020-09-28 NOTE — PROGRESS NOTE ADULT - SUBJECTIVE AND OBJECTIVE BOX
OVERNIGHT EVENTS: Received 5mg xyprexa o/n for agitation.    SUBJECTIVE / INTERVAL HPI: Patient seen and examined at bedside. Patient AOx1 (name only). Patient disoriented and disorganized, not appropriately answering questions. Unable to assess ROS 2/2 poor cognition.    VITAL SIGNS:  Vital Signs Last 24 Hrs  T(C): 36.6 (28 Sep 2020 05:46), Max: 36.7 (27 Sep 2020 12:55)  T(F): 97.8 (28 Sep 2020 05:46), Max: 98 (27 Sep 2020 12:55)  HR: 86 (28 Sep 2020 05:46) (63 - 86)  BP: 109/71 (28 Sep 2020 05:46) (109/71 - 139/75)  BP(mean): --  RR: 16 (28 Sep 2020 05:46) (16 - 18)  SpO2: 97% (28 Sep 2020 05:46) (97% - 98%)    PHYSICAL EXAM:    General: frail, elderly female, agitated w/ b/l UE restraints, speaking in full sentences on RA but incoherent and answers questions inappropriately  HEENT: NC/AT; PERRL, EOMI, anicteric sclera;   Cardiovascular: NRRR; +S1/S2, no r/m/g  Respiratory: CTA B/L; no W/R/R; no retractions or accessory muscle use  Gastrointestinal: soft, NT/ND; +BSx4  Extremities: WWP; no edema, clubbing or cyanosis  Vascular: 2+ radial, DP pulses B/L  Neurological: AAOx1 (name only); unable to perform full neuro exam 2/2 mental status, patient alert, able to follow simple commands, moves all 4 extremities spontaneously    MEDICATIONS:  MEDICATIONS  (STANDING):  enoxaparin Injectable 40 milliGRAM(s) SubCutaneous every 24 hours  multivitamin  Chewable 1 Tablet(s) Oral daily  OLANZapine Injectable 5 milliGRAM(s) IntraMuscular once  thiamine 100 milliGRAM(s) Oral daily    MEDICATIONS  (PRN):      ALLERGIES:  Allergies    No Known Allergies    Intolerances        LABS:                        11.7   4.60  )-----------( 215      ( 27 Sep 2020 07:36 )             36.5     09-27    140  |  106  |  16  ----------------------------<  85  3.6   |  26  |  0.62    Ca    8.8      27 Sep 2020 07:36  Phos  2.9     09-27  Mg     2.0     09-27    TPro  7.3  /  Alb  4.2  /  TBili  0.4  /  DBili  x   /  AST  28  /  ALT  14  /  AlkPhos  86  09-26    PT/INR - ( 26 Sep 2020 18:26 )   PT: 12.0 sec;   INR: 1.00          PTT - ( 26 Sep 2020 18:26 )  PTT:29.5 sec  Urinalysis Basic - ( 26 Sep 2020 22:33 )    Color: Yellow / Appearance: Clear / SG: >=1.030 / pH: x  Gluc: x / Ketone: NEGATIVE  / Bili: Negative / Urobili: 0.2 E.U./dL   Blood: x / Protein: NEGATIVE mg/dL / Nitrite: NEGATIVE   Leuk Esterase: NEGATIVE / RBC: x / WBC x   Sq Epi: x / Non Sq Epi: x / Bacteria: x      CAPILLARY BLOOD GLUCOSE          RADIOLOGY & ADDITIONAL TESTS: Reviewed.

## 2020-09-28 NOTE — BEHAVIORAL HEALTH ASSESSMENT NOTE - NSBHADMITCOUNSEL_PSY_A_CORE
diagnostic results/impressions and/or recommended studies/instructions for management, treatment and follow up/risk factor reduction

## 2020-09-28 NOTE — PHYSICAL THERAPY INITIAL EVALUATION ADULT - GAIT DEVIATIONS NOTED, PT EVAL
decreased step length/decreased rafi/decreased weight-shifting ability/unsteady gait, mod assist to maneuver rolling walker around obstacles, requires max A to redirect patient back to room, highly confused, easily distracted, attempts to pick things up off floor throughout PT session, dec safety awareness

## 2020-09-28 NOTE — PROGRESS NOTE ADULT - PROBLEM SELECTOR PLAN 5
F: No IVF  E: Replete PRN  N: mech soft w/ thin liquids  DVT Ppx: Lovenox  Code; FULL CODE   Dispo: [facility]

## 2020-09-28 NOTE — PHYSICAL THERAPY INITIAL EVALUATION ADULT - GENERAL OBSERVATIONS, REHAB EVAL
Received supine in NAD, denies pain +IV hep, BUE restraint, 1:1 supervision. Left as found +RN Dane aware, call nielsen

## 2020-09-28 NOTE — CONSULT NOTE ADULT - SUBJECTIVE AND OBJECTIVE BOX
Patient is a 80y old  Female who presents with a chief complaint of failure to thrive (27 Sep 2020 01:39)       HPI:  80F, poor historian, with no known medical history with exception of dementia, last saw PCP 10 years ago, not on any medication, brought in to the ED by friend who states patient has been having a progressive cognitive decline and failure to thrive, however patient now is in a situation where her home is unlivable and not able to take care of herself. Today, patient was found to be naked with a bathrobe barely covering herself in a diner. She went on to yell at customers and diner employee called patient's friend, who ultimately brought her to the ED. Per chart review, patient refuses to see PCP.  Unable to obtain history from patient, as very lethargic and incoherent, after receiving 4 mg Ativan in the ED. Per chart review, patient was very disorganized, agitated, and did not make any coherent sentences. ROS unable to obtain.     In the ED: Initial vital signs: T: 98.4 F, HR: 95, BP: 127/57, R: 18, SpO2: 97% on RA  ED course: s/p 2mg Ativan x2, 1L NS  Labs: normal cbc/bmp, lipase elevated 71. UA negative, COVID negative   Imaging: CT head: no acute intracranial pathology, severe chronic microvascular disease   CXR: No acute infiltrate or effusions   EKG: NSR HR 80s, no acute ST or T wave changes   Medications:  none   Consults: none  (27 Sep 2020 01:39)      PAST MEDICAL & SURGICAL HISTORY:  Dementia with behavioral problem    No significant past surgical history        MEDICATIONS  (STANDING):  enoxaparin Injectable 40 milliGRAM(s) SubCutaneous every 24 hours  multivitamin  Chewable 1 Tablet(s) Oral daily  OLANZapine Injectable 5 milliGRAM(s) IntraMuscular once  thiamine 100 milliGRAM(s) Oral daily    MEDICATIONS  (PRN):      FAMILY HISTORY:  No pertinent family history in first degree relatives        CBC Full  -  ( 27 Sep 2020 07:36 )  WBC Count : 4.60 K/uL  RBC Count : 3.95 M/uL  Hemoglobin : 11.7 g/dL  Hematocrit : 36.5 %  Platelet Count - Automated : 215 K/uL  Mean Cell Volume : 92.4 fl  Mean Cell Hemoglobin : 29.6 pg  Mean Cell Hemoglobin Concentration : 32.1 gm/dL  Auto Neutrophil # : 2.07 K/uL  Auto Lymphocyte # : 1.96 K/uL  Auto Monocyte # : 0.40 K/uL  Auto Eosinophil # : 0.13 K/uL  Auto Basophil # : 0.03 K/uL  Auto Neutrophil % : 45.0 %  Auto Lymphocyte % : 42.6 %  Auto Monocyte % : 8.7 %  Auto Eosinophil % : 2.8 %  Auto Basophil % : 0.7 %      09-27    140  |  106  |  16  ----------------------------<  85  3.6   |  26  |  0.62    Ca    8.8      27 Sep 2020 07:36  Phos  2.9     09-27  Mg     2.0     09-27    TPro  7.3  /  Alb  4.2  /  TBili  0.4  /  DBili  x   /  AST  28  /  ALT  14  /  AlkPhos  86  09-26      Urinalysis Basic - ( 26 Sep 2020 22:33 )    Color: Yellow / Appearance: Clear / SG: >=1.030 / pH: x  Gluc: x / Ketone: NEGATIVE  / Bili: Negative / Urobili: 0.2 E.U./dL   Blood: x / Protein: NEGATIVE mg/dL / Nitrite: NEGATIVE   Leuk Esterase: NEGATIVE / RBC: x / WBC x   Sq Epi: x / Non Sq Epi: x / Bacteria: x          Radiology:    < from: Xray Chest 1 View- PORTABLE-Urgent (Xray Chest 1 View- PORTABLE-Urgent .) (09.26.20 @ 20:53) >    EXAM:  XR CHEST PORTABLE URGENT 1V                          PROCEDURE DATE:  09/26/2020          INTERPRETATION:  Clinical History: AMS    Frontal examination of the chest demonstrates the heart to be within normal limits in transverse diameter. Noacute infiltrates. Possible granuloma right upper lobe. Mild dextroscoliosis thoracic spine. Indication aortic knob.    IMPRESSION: No acute infiltrates        < from: CT Head No Cont (09.26.20 @ 22:14) >  EXAM:  CT BRAIN                          PROCEDURE DATE:  09/26/2020          INTERPRETATION:  HEAD CT WITHOUT CONTRAST dated 9/26/2020 10:14 PM    HISTORY: Altered mental status.    TECHNIQUE: Head CT was performed without intravenous contrast. Axial, sagittal, and coronal images were obtained.    COMPARISON: Head CT from 1/31/2019.    FINDINGS:    There are prominent ventricles and sulci most compatible with age-appropriate generalized parenchymal volume loss. The gray-white matter differentiation is preserved. Grossly stable confluent hypodensities in the supratentorial white matter are consistent with severe chronic microvascular ischemic disease. There is no hemorrhage or mass effect.    The calvarium is intact. The visualized paranasal sinuses and mastoid air cells are predominantly clear.    IMPRESSION:    No acute intracranial abnormality. No substantial change since 1/31/2019.            Vital Signs Last 24 Hrs  T(C): 36.6 (28 Sep 2020 05:46), Max: 36.7 (27 Sep 2020 12:55)  T(F): 97.8 (28 Sep 2020 05:46), Max: 98 (27 Sep 2020 12:55)  HR: 86 (28 Sep 2020 05:46) (63 - 86)  BP: 109/71 (28 Sep 2020 05:46) (109/71 - 139/75)  BP(mean): --  RR: 16 (28 Sep 2020 05:46) (16 - 18)  SpO2: 97% (28 Sep 2020 05:46) (97% - 98%)    REVIEW OF SYSTEMS: per HPI      Physical Exam: ill appearing, cachectic 81 yo  woman lying in bed, somnolent opens eyes with verbal cues    Head: normocephalic, atraumatic    Eyes: PERRLA, EOMI, no nystagmus, sclera anicteric    ENT: nasal discharge, uvula midline, no oropharyngeal erythema/exudate    Neck: supple, negative JVD, negative carotid bruits, no thyromegaly    Chest: CTA bilaterally, neg wheeze/ rhonchi/ rales/ crackles/ egophany    Cardiovascular: regular rate and rhythm, neg murmurs/rubs/gallops    Abdomen: soft, non distended, non tender to palpation in all 4 quadrants, negative rebound/guarding, normal bowel sounds    Extremities: WWP, neg cyanosis/clubbing/edema, negative calf tenderness to palpation, negative Odin's sign    Musculoskeletal:      :     Neurologic Exam:    somnolent , opens eyes briefly with verbal cues  Cranial Nerves:     II:                       pupils equal, round and reactive to light, visual fields intact to blink to threat  III/ IV/VI:            extraocular movements intact, neg nystagmus, neg ptosis  V:                     unable to assess secondary to somnolence  VII:                     face symmetric, no droop, normal eye closure and smile  VIII:                    hearing intact to finger rub bilaterally  IX/ X:                 hearing intact to finger rub bilaterally  XI:                      hearing intact to finger rub bilaterally  XII:                      hearing intact to finger rub bilaterally    Motor Exam:    Upper Extremities:     Right:   moves extremity antigravity with pinch    Left:    moves extremity antigravity with pinch      Lower Extremities:    Right:   moves extremity antigravity with pinch    Left :    moves extremity antigravity with pinch               Sensation: Intact to pinch                       DTR:            = biceps/     triceps/     brachioradialis                      = patella/   medial hamstring/ankle                      neg clonus                      neg Babinski                        Gait:  not tested        PM&R Impression:    1) Faiure to thrive  2) deconditioned  3) no focal weakness  4) dementia    Plan:    1) Physical therapy focusing on therapeutic exercises, bed mobility/transfer out of bed evaluation, progressive ambulation with assistive devices prn.    2) Anticipated Disposition Plan/Recs:    subacute rehab placement

## 2020-09-28 NOTE — PHYSICAL THERAPY INITIAL EVALUATION ADULT - ADDITIONAL COMMENTS
patient poor historian; according to Psychosocial Assessment: patient lives alone, in apartment with elevator and doorman, with history of multiple falls

## 2020-09-28 NOTE — BEHAVIORAL HEALTH ASSESSMENT NOTE - HPI (INCLUDE ILLNESS QUALITY, SEVERITY, DURATION, TIMING, CONTEXT, MODIFYING FACTORS, ASSOCIATED SIGNS AND SYMPTOMS)
The patient is an 80 year old female with a history of dementia and no known past psychiatric history who was brought to the ED by her friend due to agitation and failure to thrive. Psychiatry was consulted to determine the patient's capacity to participate in discharge planning and to help assess appropriate management for the patient's agitation. Since admission, the patient has had to be repeatedly restrained due to continually attempting to get out of bed unassisted. The patient was a poor historian and was unable to describe the reason for her hospitalization or discuss her perception of her current condition. She described being "scared", "feeling sick" and general discontent due to being hospitalized and needing to be restrained.   Collateral provided by the nephew, Murtaza, was ascertained. He stated that the patient has suffered from "psychiatric issues" for, "decades", including the delusion that, " God told her she was going to have a baby when she was 74". He states the dementia symptoms began in 2018 at which time he reported her to APS after finding her cleaning dishes in her bathtub. He last saw her on Friday 9/25 after his mother requested he check on Ms. Yancey after Ms. Richardson called her telling her there were homeless people around her building on the [facility] and possibly in her apartment. He states that when he saw her she appeared to be in good spirits, oriented x3, was well nourished and had food and beverages in her refrigerator. He stated that he was surprised to hear she had been hospitalized. He mentioned he has filed Article 81 procedure to attain legal guardianship. The patient is an 80 year old female with a history of dementia and no known past psychiatric history who was brought to the ED by her friend due to agitation and failure to thrive. Psychiatry was consulted to determine the patient's capacity to participate in discharge planning and to help assess appropriate management for the patient's agitation.   Collateral provided by the nephew, Murtaza, was ascertained. He stated that the patient has suffered from "psychiatric issues" for, "decades". He states the dementia symptoms began in 2018 at which time he reported her to APS after finding her cleaning dishes in her bathtub. He last saw her on Friday 9/25 after his mother requested he check on Ms. Yancey after Ms. Richardson called her telling her there were homeless people around her building on the [facility] and possibly in her apartment. He states that when he saw her she appeared to be in good spirits, oriented x3, was well nourished and had food and beverages in her refrigerator. He stated that he was surprised to hear she had been hospitalized. He mentioned he has filed Article 81 procedure to attain legal guardianship. The patient is an 80 year old female with a history of dementia and no known past psychiatric history who was brought to the ED by her friend due to agitation and failure to thrive. Psychiatry was consulted to determine the patient's capacity to participate in discharge planning and to help assess appropriate management for the patient's agitation.   Per chart review and primary team, patient is a poor historian. P  Collateral provided by the nephew, Murtaza, was ascertained. He stated that the patient has suffered from "psychiatric issues" for, "decades". He states the dementia symptoms began in 2018 at which time he reported her to APS after finding her cleaning dishes in her bathtub. He last saw her on Friday 9/25 after his mother requested he check on Ms. Yancey after Ms. Richardson called her telling her there were homeless people around her building on the [facility] and possibly in her apartment. He states that when he saw her she appeared to be in good spirits, oriented x3, was well nourished and had food and beverages in her refrigerator. He stated that he was surprised to hear she had been hospitalized. He mentioned he has filed Article 81 procedure to attain legal guardianship. The patient is an 80 year old female with a history of dementia and no known past psychiatric history who was brought to the ED by her friend due to agitation and failure to thrive. Psychiatry was consulted to determine the patient's capacity to participate in discharge planning and to help assess appropriate management for the patient's agitation.     Per chart review and primary team, patient is a poor historian. Prior to being evaluated by the CL team, patient received several prns for agitation. Patient was seen at beside. She presented in bed, in soft restraints. Patient presented confused, disoriented and tangential, unable to engage in a discussion about the reason for her admission/risk& benefits of the treatment/discharge planning.    Collateral provided by the nephew, Murtaza, was ascertained. He stated that the patient has suffered from "psychiatric issues" for, "decades". He states the dementia symptoms began in 2018 at which time he reported her to APS after finding her cleaning dishes in her bathtub. He last saw her on Friday 9/25 after his mother requested he check on Ms. Yancey after Ms. Richardson called her telling her there were homeless people around her building on the [facility] and possibly in her apartment. He states that when he saw her she appeared to be in good spirits, oriented x3, was well nourished and had food and beverages in her refrigerator. He stated that he was surprised to hear she had been hospitalized. He mentioned he has filed Article 81 procedure to attain legal guardianship.

## 2020-09-28 NOTE — BEHAVIORAL HEALTH ASSESSMENT NOTE - RISK ASSESSMENT
Unable to determine Suicide Risk Unable to determine suicide risk at this time (patient cannot engage in a risk assessment). As per chart review and collaterals, patient's risk is low (not history of self harm/mood disorder).    Patient is at risk of becoming agitated due to underlying chronic cognitive disorder +/delirium

## 2020-09-28 NOTE — PHYSICAL THERAPY INITIAL EVALUATION ADULT - PERTINENT HX OF CURRENT PROBLEM, REHAB EVAL
80F brought in to the ED by friend who states patient has been having a progressive cognitive decline and failure to thrive, however patient now is in a situation where her home is unlivable and not able to take care of herself. Today, patient was found to be naked with a bathrobe barely covering herself in a diner.

## 2020-09-28 NOTE — BEHAVIORAL HEALTH ASSESSMENT NOTE - ADDITIONAL DETAILS / COMMENTS
The patient did not appropriately answer the questions asked of her. Occasionally, she would begin to answer a question and then begin speaking tangentially about something else, never returning to the original topic. She was difficult to understand due to poor enunciation.

## 2020-09-28 NOTE — PROGRESS NOTE ADULT - PROBLEM SELECTOR PLAN 2
Pt with longstanding history of dementia, in the ED, was very agitated and incoherent.   - RPR neg  - B12, folate, TSH wnl  - consider Psych consult in AM for management   - avoid benzodiazepines, consider IM Zyprexa, IV Haldol, or Seroquel

## 2020-09-28 NOTE — PHYSICAL THERAPY INITIAL EVALUATION ADULT - CRITERIA FOR SKILLED THERAPEUTIC INTERVENTIONS
functional limitations in following categories/impairments found/rehab potential/therapy frequency/anticipated discharge recommendation

## 2020-09-28 NOTE — CONSULT NOTE ADULT - ASSESSMENT
per Internal Medicine    80F, poor historian, with no known medical history with exception of dementia, last saw PCP 10 years ago, not on any medication, brought in to the ED by friend for failure to thrive and acute on chronic dementia w/ behavioral disturbance.    Problem/Plan - 1   ·  Problem: Failure to thrive in adult.  Plan: Pt unable to take care of self, likely 2/2 progressive worsening cognitive decline/dementia. Pt appears malnourished and unkempt. No focal neuro deficits on exam, CT head negative.   - PT consult- will likely need EDWARD  - CM/SW in AM  - Speech/swallow and Nutrition consult in AM.     Problem/Plan - 2   ·  Problem: Dementia with behavioral problem.  Plan: Pt with longstanding history of dementia, in the ED, was very agitated and incoherent.   - f/u RPR, B12, folate, TSH  - consider Psych consult in AM for management   - avoid benzodiazepines, consider IV Haldol, Seroquel, or IM Zyprexa.     Problem/Plan - 3   ·  Problem: Elevated lipase.  Plan: Lipase minimally elevated to 71, no other signs of pancreatitis.     Problem/Plan - 4   ·  Problem: Nutrition, metabolism, and development symptoms.  Plan: F: No IVF  E: Replete PRN  N: NPO (pt still altered)  DVT Ppx: Lovenox  Code; FULL CODE   Dispo: [facility].

## 2020-09-28 NOTE — PHYSICAL THERAPY INITIAL EVALUATION ADULT - LEVEL OF INDEPENDENCE: SCOOT/BRIDGE, REHAB EVAL
Constitutional: + generalized weakness. no fever, chills, no recent weight loss, change in appetite or malaise  Eyes: no redness/discharge/pain/vision changes  Cardiac: No chest pain, SOB or edema.  Respiratory: No cough or respiratory distress  GI: No nausea, vomiting, diarrhea or abdominal pain.  : No dysuria, frequency, urgency or hematuria  MS: no pain to back or extremities, no loss of ROM, no weakness  Neuro: + lightheadedness, neat syncope. No headache, paresthesias  Skin: No skin rash.  Endocrine: No history of thyroid disease or diabetes.  Except as documented in the HPI, all other systems are negative. contact guard

## 2020-09-28 NOTE — BEHAVIORAL HEALTH ASSESSMENT NOTE - SUMMARY
Patient is a 81 y/o woman with no known PPH, history of dementia,  not on any medication, brought in to the ED by friend for failure to thrive and agitation. Psychiatry was consulted to evaluate patient's capacity to participate in discharge planning and make recommendations for treatment of agitation. Patient currently presents very sedated after multiple dosages of prns and cannot participate in a full evaluation. As per collaterals and chart review patient presents at baseline AAOx1 and has been deteriorating for the last 3 years. It is unclear what precipitated her agitation prior to admission (medical workup for r/o delirium until now has been negative). In the hospital patient received 2 dosages of lorazepam which likely worsened her agitation.   Plan:  -increase seroquel to 50mg po qhs   -continue olanzapine 2.5mg po/im q6h prn agitation  (monitor the EKG for qtc prolongation)  -avoid benzodiazepines/anticholinergics   -medical workup for r/o medical causes of delirium as per primary team  -at this time patient does not have capacity to participate in discharge planning (rehab); please defer to HCP  -CL to follow Patient is a 81 y/o woman with no known PPH, history of dementia,  not on any medication, brought in to the ED by friend for failure to thrive and agitation. Psychiatry was consulted to evaluate patient's capacity to participate in discharge planning and make recommendations for treatment of agitation. Patient currently presents very sedated after multiple dosages of prns and cannot participate in a full evaluation. As per collaterals and chart review patient presents at baseline AAOx1 and has been deteriorating for the last 3 years. It is unclear what precipitated her agitation prior to admission (medical workup for r/o delirium until now has been negative). In the hospital patient received 2 dosages of lorazepam which likely worsened her agitation.   Plan:  -increase seroquel to 50mg po qhs   -continue olanzapine 2.5mg po/im q6h prn agitation  (monitor the EKG for qtc prolongation)  -avoid benzodiazepines/anticholinergics/ other medications that can precipitate delirium/agitation in the geriatric patient population   -medical workup for r/o medical causes of delirium as per primary team  -at this time patient does not have capacity to participate in discharge planning (rehab); please defer to HCP  -CL to follow Patient is a 81 y/o woman with no known PPH, history of dementia,  not on any medication, brought in to the ED by friend for failure to thrive and agitation. Psychiatry was consulted to evaluate patient's capacity to participate in discharge planning and make recommendations for treatment of agitation. Patient currently presents very sedated after multiple dosages of prns and cannot participate in a full evaluation. As per collaterals and chart review patient presents at baseline AAOx2-3 and has been deteriorating for the last 3 years. It is unclear what precipitated her agitation prior to admission (medical workup for r/o delirium until now has been negative). In the hospital patient received 2 dosages of lorazepam which likely worsened her agitation.   Plan:  -increase seroquel to 50mg po qhs   -continue olanzapine 2.5mg po/im q6h prn agitation  (monitor the EKG for qtc prolongation)  -avoid benzodiazepines/anticholinergics/ other medications that can precipitate delirium/agitation in the geriatric patient population   -medical workup for r/o medical causes of delirium as per primary team  -at this time patient does not have capacity to participate in discharge planning (rehab); please defer to HCP  -CL to follow

## 2020-09-29 PROCEDURE — 99233 SBSQ HOSP IP/OBS HIGH 50: CPT

## 2020-09-29 PROCEDURE — 99233 SBSQ HOSP IP/OBS HIGH 50: CPT | Mod: GC

## 2020-09-29 RX ORDER — QUETIAPINE FUMARATE 200 MG/1
50 TABLET, FILM COATED ORAL EVERY 12 HOURS
Refills: 0 | Status: DISCONTINUED | OUTPATIENT
Start: 2020-09-29 | End: 2020-10-02

## 2020-09-29 RX ADMIN — OLANZAPINE 2.5 MILLIGRAM(S): 15 TABLET, FILM COATED ORAL at 05:40

## 2020-09-29 RX ADMIN — QUETIAPINE FUMARATE 50 MILLIGRAM(S): 200 TABLET, FILM COATED ORAL at 18:08

## 2020-09-29 RX ADMIN — QUETIAPINE FUMARATE 50 MILLIGRAM(S): 200 TABLET, FILM COATED ORAL at 11:34

## 2020-09-29 RX ADMIN — ENOXAPARIN SODIUM 40 MILLIGRAM(S): 100 INJECTION SUBCUTANEOUS at 05:07

## 2020-09-29 RX ADMIN — Medication 1 TABLET(S): at 11:34

## 2020-09-29 RX ADMIN — OLANZAPINE 2.5 MILLIGRAM(S): 15 TABLET, FILM COATED ORAL at 17:48

## 2020-09-29 RX ADMIN — OLANZAPINE 2.5 MILLIGRAM(S): 15 TABLET, FILM COATED ORAL at 23:14

## 2020-09-29 NOTE — PROGRESS NOTE BEHAVIORAL HEALTH - OTHER
unable to assess as patient was constrained Patient spoke of a tall man named Rudy whom she had recently started dating and stated that she recently started developing a building with her friend, Esme. Patient did not answer questions regarding this, but did not appear to be experiencing hallucinations at the time of the visit. Patient spoke of multiple men being present in her home prior to coming to the hospital. Unsure if this transpired. unable to assess Unable to assess

## 2020-09-29 NOTE — PROGRESS NOTE ADULT - SUBJECTIVE AND OBJECTIVE BOX
Physical Medicine and Rehabilitation Progress Note:    Patient is a 80y old  Female who presents with a chief complaint of failure to thrive (29 Sep 2020 11:05)      HPI:  80F, poor historian, with no known medical history with exception of dementia, last saw PCP 10 years ago, not on any medication, brought in to the ED by friend who states patient has been having a progressive cognitive decline and failure to thrive, however patient now is in a situation where her home is unlivable and not able to take care of herself. Today, patient was found to be naked with a bathrobe barely covering herself in a diner. She went on to yell at customers and diner employee called patient's friend, who ultimately brought her to the ED. Per chart review, patient refuses to see PCP.  Unable to obtain history from patient, as very lethargic and incoherent, after receiving 4 mg Ativan in the ED. Per chart review, patient was very disorganized, agitated, and did not make any coherent sentences. ROS unable to obtain.     In the ED: Initial vital signs: T: 98.4 F, HR: 95, BP: 127/57, R: 18, SpO2: 97% on RA  ED course: s/p 2mg Ativan x2, 1L NS  Labs: normal cbc/bmp, lipase elevated 71. UA negative, COVID negative   Imaging: CT head: no acute intracranial pathology, severe chronic microvascular disease   CXR: No acute infiltrate or effusions   EKG: NSR HR 80s, no acute ST or T wave changes   Medications:  none   Consults: none  (27 Sep 2020 01:39)                            14.4   10.28 )-----------( 242      ( 28 Sep 2020 11:02 )             44.4       09-28    139  |  101  |  10  ----------------------------<  122<H>  3.8   |  25  |  0.62    Ca    9.5      28 Sep 2020 11:02  Mg     1.9     09-28      Vital Signs Last 24 Hrs  T(C): 36.5 (29 Sep 2020 13:50), Max: 36.8 (29 Sep 2020 05:39)  T(F): 97.7 (29 Sep 2020 13:50), Max: 98.2 (29 Sep 2020 05:39)  HR: 104 (29 Sep 2020 13:50) (84 - 104)  BP: 110/67 (29 Sep 2020 13:50) (100/64 - 139/80)  BP(mean): --  RR: 20 (29 Sep 2020 13:50) (16 - 20)  SpO2: 95% (29 Sep 2020 13:50) (94% - 97%)    MEDICATIONS  (STANDING):  enoxaparin Injectable 40 milliGRAM(s) SubCutaneous every 24 hours  multivitamin  Chewable 1 Tablet(s) Oral daily  QUEtiapine 50 milliGRAM(s) Oral every 12 hours    MEDICATIONS  (PRN):  OLANZapine Injectable 2.5 milliGRAM(s) IntraMuscular every 6 hours PRN agitation    Currently Undergoing Physical/ Occupational Therapy at bedside.    Functional Status Assessment:   9/28/2020    Previous Level of Function:     · Ambulation Skills	independent  · Transfer Skills	independent  · ADL Skills	independent  · Work/Leisure Activity	independent  · Additional Comments	patient poor historian; according to Psychosocial Assessment: patient lives alone, in apartment with elevator and doorman, with history of multiple falls    Cognitive Status Examination:   · Orientation	person  · Level of Consciousness	confused  · Follows Commands and Answers Questions	75% of the time  · Personal Safety and Judgment	impaired  · Short Term Memory	impaired  · Long Term Memory	impaired    Range of Motion Exam:   · Range of Motion Examination	no ROM deficits were identified  · Active Range of Motion Examination	no Active ROM deficits were identified    Manual Muscle Testing:   · Manual Muscle Testing Results	grossly assessed due to  functional observation against gravity > 3/5 MMT    Bed Mobility: Rolling/Turning:     · Level of Stanley	contact guard    Bed Mobility: Scooting/Bridging:     · Level of Stanley	contact guard    Bed Mobility: Sit to Supine:     · Level of Stanley	moderate assist (50% patients effort)  · Physical Assist/Nonphysical Assist	1 person assist    Bed Mobility: Supine to Sit:     · Level of Stanley	minimum assist (75% patients effort)  · Physical Assist/Nonphysical Assist	1 person assist; nonverbal cues (demo/gestures)    Transfer: Sit to Stand:     · Level of Stanley	contact guard  · Assistive Device	rolling walker    Transfer: Stand to Sit:     · Level of Stanley	contact guard  · Assistive Device	rolling walker    Gait Skills:     · Level of Stanley	minimum assist (75% patients effort)  · Physical Assist/Nonphysical Assist	1 person assist  · Assistive Device	rolling walker  · Gait Distance	50 feet; FIM1    Gait Analysis:     · Gait Deviations Noted	unsteady gait, mod assist to maneuver rolling walker around obstacles, requires max A to redirect patient back to room, highly confused, easily distracted, attempts to pick things up off floor throughout PT session, dec safety awareness; decreased rafi; decreased step length; decreased weight-shifting ability  · Impairments Contributing to Gait Deviations	impaired balance; cognition; decreased strength    Balance Skills Assessment:     · Sitting Balance: Static	fair plus  · Sitting Balance: Dynamic	fair plus  · Sit-to-Stand Balance	poor plus  · Standing Balance: Static	poor plus  · Standing Balance: Dynamic	poor plus  · Systems Impairment Contributing to Balance Disturbance	cognitive; musculoskeletal  · Identified Impairments Contributing to Balance Disturbance	decreased strength    Clinical Impressions:   · Criteria for Skilled Therapeutic Interventions	impairments found; functional limitations in following categories; rehab potential; therapy frequency; anticipated discharge recommendation  · Impairments Found (describe specific impairments)	aerobic capacity/endurance; gait, locomotion, and balance; muscle strength; poor safety awareness  · Functional Limitations in Following Categories (describe specific limitations)	self-care; home management; community/leisure  · Risk Reduction/Prevention (Describe Specific Areas of risk reduction/prevention)	risk factors  · Risk Areas	fall  · Rehab Potential	fair, will monitor progress closely  · Therapy Frequency	2-3x/week        PM&R Impression: as above    Current Disposition Plan Recommendations:    subacute rehab placement

## 2020-09-29 NOTE — PROGRESS NOTE ADULT - PROBLEM SELECTOR PLAN 2
Pt with longstanding history of dementia, in the ED, was very agitated and incoherent.   - RPR neg  - B12, folate, TSH wnl  - Psych on consult, appreciate further recs  - Seroquel 50mg qHS  - olanzipine 2.5mg q6 hours prn for agitation

## 2020-09-29 NOTE — PROGRESS NOTE BEHAVIORAL HEALTH - NSBHFUPINTERVALHXFT_PSY_A_CORE
Patient seen at bedside. She presents calm, pleasant, making notes in a notepad. She is not able to state where she is, the date nor her reason for admission. 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 States that she has a good relationship to her friend Esme and nephew.

## 2020-09-29 NOTE — PROGRESS NOTE ADULT - PROBLEM SELECTOR PLAN 5
F: No IVF  E: Replete PRN  N: mech soft w/ thin liquids  DVT Ppx: Lovenox  Code; FULL CODE   Dispo: [facility], [place]

## 2020-09-29 NOTE — PROGRESS NOTE ADULT - PROBLEM SELECTOR PLAN 3
Patient w/ reported fall on 9/27 w/o obvious trauma. CT head without acute bleed  - PT when AMS stable

## 2020-09-29 NOTE — PROGRESS NOTE ADULT - SUBJECTIVE AND OBJECTIVE BOX
INTERVAL HPI/OVERNIGHT EVENTS:  Patient was seen and examined at bedside. As per nurse and patient, no o/n events, patient resting comfortably. No complaints at this time. Patient denies: fever, chills, dizziness, weakness, HA, Changes in vision, CP, palpitations, SOB, cough, N/V/D/C, dysuria, changes in bowel movements, LE edema. ROS otherwise negative.    VITAL SIGNS:  T(F): 98.2 (09-29-20 @ 05:39)  HR: 84 (09-29-20 @ 05:39)  BP: 139/80 (09-29-20 @ 05:39)  RR: 18 (09-29-20 @ 05:39)  SpO2: 97% (09-29-20 @ 05:39)  Wt(kg): --        PHYSICAL EXAM:    Constitutional: WDWN resting comfortably in bed; NAD  HEENT: NC/AT, PERRL, EOMI, anicteric sclera, no nasal discharge; uvula midline, no oropharyngeal erythema or exudates; MMM  Neck: supple; no JVD or thyromegaly  Respiratory: CTA B/L; no W/R/R, no retractions  Cardiac: +S1/S2; RRR; no M/R/G; PMI non-displaced  Gastrointestinal: soft, NT/ND; no rebound or guarding  Back: spine midline, no bony tenderness or step-offs; no CVAT B/L  Extremities: WWP, no clubbing or cyanosis; no peripheral edema  Musculoskeletal: NROM x4; no joint swelling, tenderness or erythema  Vascular: 2+ radial, DP/PT pulses B/L  Dermatologic: skin warm, dry and intact; no rashes, wounds, or scars  Lymphatic: no submandibular or cervical LAD  Neurologic: AAOx3; CNII-XII grossly intact; no focal deficits  Psychiatric: affect and characteristics of appearance, verbalizations, behaviors are appropriate    MEDICATIONS  (STANDING):  enoxaparin Injectable 40 milliGRAM(s) SubCutaneous every 24 hours  multivitamin  Chewable 1 Tablet(s) Oral daily  QUEtiapine 50 milliGRAM(s) Oral at bedtime    MEDICATIONS  (PRN):  OLANZapine Injectable 2.5 milliGRAM(s) IntraMuscular every 6 hours PRN agitation      Allergies    No Known Allergies    Intolerances        LABS:                        14.4   10.28 )-----------( 242      ( 28 Sep 2020 11:02 )             44.4     09-28    139  |  101  |  10  ----------------------------<  122<H>  3.8   |  25  |  0.62    Ca    9.5      28 Sep 2020 11:02  Mg     1.9     09-28            RADIOLOGY & ADDITIONAL TESTS:  Reviewed INTERVAL HPI/OVERNIGHT EVENTS:  Patient was seen and examined at bedside. As per nurse and patient, required wrist restraints and frequent redirection overnight, more calm during day. Patient denies: fever, chills, dizziness, weakness, HA, Changes in vision, CP, palpitations, SOB, cough, N/V/D/C, dysuria, changes in bowel movements, LE edema. ROS otherwise negative.    VITAL SIGNS:  T(F): 98.2 (09-29-20 @ 05:39)  HR: 84 (09-29-20 @ 05:39)  BP: 139/80 (09-29-20 @ 05:39)  RR: 18 (09-29-20 @ 05:39)  SpO2: 97% (09-29-20 @ 05:39)        PHYSICAL EXAM:    Constitutional: resting in bed asleep initially, later in day calmly resting in bed with sitter but no restraints  HEENT: NC/AT, PER, no nasal discharge or flaring  Respiratory: normal WOB, anterior lung fields clear to auscultation  Gastrointestinal: soft, NT/ND; no guarding  Neurologic: AAOx1 (name); moving all limbs spontaneously, not answering questions with more than 1-2 words    MEDICATIONS  (STANDING):  enoxaparin Injectable 40 milliGRAM(s) SubCutaneous every 24 hours  multivitamin  Chewable 1 Tablet(s) Oral daily  QUEtiapine 50 milliGRAM(s) Oral at bedtime    MEDICATIONS  (PRN):  OLANZapine Injectable 2.5 milliGRAM(s) IntraMuscular every 6 hours PRN agitation      Allergies    No Known Allergies    Intolerances        LABS:                        14.4   10.28 )-----------( 242      ( 28 Sep 2020 11:02 )             44.4     09-28    139  |  101  |  10  ----------------------------<  122<H>  3.8   |  25  |  0.62    Ca    9.5      28 Sep 2020 11:02  Mg     1.9     09-28            RADIOLOGY & ADDITIONAL TESTS:  Reviewed

## 2020-09-29 NOTE — PROGRESS NOTE ADULT - PROBLEM SELECTOR PLAN 1
Pt unable to take care of self, likely 2/2 progressive worsening cognitive decline/dementia. Pt appears malnourished and unkempt. No focal neuro deficits on exam, CT head negative.  - f/u PT consult- will likely need [facility] once mental status stable  - f/u CM/SW  - bedside swallow rec: mech soft w/ thin liquids  - Nutrition: recommend advancing diet to regular w/ ensure enlive TID w/ consistency per SLP recs, MVI, thiamine

## 2020-09-30 PROCEDURE — 99233 SBSQ HOSP IP/OBS HIGH 50: CPT | Mod: GC

## 2020-09-30 PROCEDURE — 99231 SBSQ HOSP IP/OBS SF/LOW 25: CPT

## 2020-09-30 RX ORDER — OLANZAPINE 15 MG/1
5 TABLET, FILM COATED ORAL ONCE
Refills: 0 | Status: COMPLETED | OUTPATIENT
Start: 2020-09-30 | End: 2020-09-30

## 2020-09-30 RX ADMIN — QUETIAPINE FUMARATE 50 MILLIGRAM(S): 200 TABLET, FILM COATED ORAL at 09:43

## 2020-09-30 RX ADMIN — OLANZAPINE 5 MILLIGRAM(S): 15 TABLET, FILM COATED ORAL at 18:35

## 2020-09-30 RX ADMIN — QUETIAPINE FUMARATE 50 MILLIGRAM(S): 200 TABLET, FILM COATED ORAL at 21:59

## 2020-09-30 RX ADMIN — OLANZAPINE 2.5 MILLIGRAM(S): 15 TABLET, FILM COATED ORAL at 07:27

## 2020-09-30 RX ADMIN — ENOXAPARIN SODIUM 40 MILLIGRAM(S): 100 INJECTION SUBCUTANEOUS at 06:14

## 2020-09-30 RX ADMIN — Medication 1 TABLET(S): at 11:04

## 2020-09-30 NOTE — PROGRESS NOTE BEHAVIORAL HEALTH - NSBHFUPINTERVALHXFT_PSY_A_CORE
Patient seen at bedside. She presents calm, pleasant, making notes in a notepad. She is not able to state where she is, the date nor her reason for admission. 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 States that she has a good relationship to her friend Esme and nephew. Patient was seen at bedside. Patient presented calm, cooperative, pleasant. Was disoriented to time/place and situation. Reported normal sleep and appetite.

## 2020-09-30 NOTE — PROGRESS NOTE ADULT - SUBJECTIVE AND OBJECTIVE BOX
***INCOMPLETE***  INTERVAL HPI/OVERNIGHT EVENTS:      On further ROS, patient did not have complaint of: Headache, Blurred Vision, Cough, Dyspnea, Palpitations, Abdominal Pain, N/V, Weakness, Change in Sensation.     VITAL SIGNS:  T(F): 97.6 (09-30-20 @ 07:59)  HR: 87 (09-30-20 @ 07:59)  BP: 109/73 (09-30-20 @ 07:59)  RR: 18 (09-30-20 @ 07:59)  SpO2: 98% (09-30-20 @ 07:59)  Wt(kg): --    Input & Output:      PHYSICAL EXAM:  Constitutional: Patient seated comfortably in bed, of appropriate color, nutrition, and hydration.   HEENT: PERRLA, Normal Range of eye movement with no complaint of diplopia, Normal Hearing  Neck: No LAD, No JVD  Back: Normal spine flexure, No CVA tenderness  Respiratory: Normal air entry, Lungs clear to auscultation b/l w/o wheeze or crepitations.   Cardiovascular: S1 and S2 present - no additional abnormal sounds or murmurs. Normal rhythm and rate of pulse.   Gastrointestinal: BS+, soft, NT/ND  Extremities: No peripheral edema  Vascular: 2+ peripheral pulses  Neurological: A/O x 3, CN V-XII grossly intact.  Psychiatric: Normal mood, normal affect  Musculoskeletal: 5/5 strength b/l upper and lower extremities  Skin: No rashes    MEDICATIONS  (STANDING):  enoxaparin Injectable 40 milliGRAM(s) SubCutaneous every 24 hours  multivitamin  Chewable 1 Tablet(s) Oral daily  QUEtiapine 50 milliGRAM(s) Oral every 12 hours    MEDICATIONS  (PRN):      Allergies    No Known Allergies    Intolerances        LABS:                        14.4   10.28 )-----------( 242      ( 28 Sep 2020 11:02 )             44.4     09-28    139  |  101  |  10  ----------------------------<  122<H>  3.8   |  25  |  0.62    Ca    9.5      28 Sep 2020 11:02  Mg     1.9     09-28            RADIOLOGY & ADDITIONAL TESTS:   ***INCOMPLETE***  INTERVAL HPI/OVERNIGHT EVENTS:  SADE o/n. Patient was disoriented this AM but noncombative and did not need any restraints overnight. On exam this AM patient only oriented to self, redirectable t    On further ROS, patient did not have complaint of: Headache, Blurred Vision, Cough, Dyspnea, Palpitations, Abdominal Pain, N/V, Weakness, Change in Sensation.     VITAL SIGNS:  T(F): 97.6 (09-30-20 @ 07:59)  HR: 87 (09-30-20 @ 07:59)  BP: 109/73 (09-30-20 @ 07:59)  RR: 18 (09-30-20 @ 07:59)  SpO2: 98% (09-30-20 @ 07:59)  Wt(kg): --    Input & Output:      PHYSICAL EXAM:  Constitutional: Patient seated comfortably in bed, of appropriate color, nutrition, and hydration.   HEENT: PERRLA, Normal Range of eye movement with no complaint of diplopia, Normal Hearing  Neck: No LAD, No JVD  Back: Normal spine flexure, No CVA tenderness  Respiratory: Normal air entry, Lungs clear to auscultation b/l w/o wheeze or crepitations.   Cardiovascular: S1 and S2 present - no additional abnormal sounds or murmurs. Normal rhythm and rate of pulse.   Gastrointestinal: BS+, soft, NT/ND  Extremities: No peripheral edema  Vascular: 2+ peripheral pulses  Neurological: A/O x 3, CN V-XII grossly intact.  Psychiatric: Normal mood, normal affect  Musculoskeletal: 5/5 strength b/l upper and lower extremities  Skin: No rashes    MEDICATIONS  (STANDING):  enoxaparin Injectable 40 milliGRAM(s) SubCutaneous every 24 hours  multivitamin  Chewable 1 Tablet(s) Oral daily  QUEtiapine 50 milliGRAM(s) Oral every 12 hours    MEDICATIONS  (PRN):      Allergies    No Known Allergies    Intolerances        LABS:                        14.4   10.28 )-----------( 242      ( 28 Sep 2020 11:02 )             44.4     09-28    139  |  101  |  10  ----------------------------<  122<H>  3.8   |  25  |  0.62    Ca    9.5      28 Sep 2020 11:02  Mg     1.9     09-28            RADIOLOGY & ADDITIONAL TESTS:   INTERVAL HPI/OVERNIGHT EVENTS:  SADE o/n. Patient was disoriented this AM but noncombative and did not need any restraints overnight. On exam this AM patient only oriented to self, redirectable but unwilling to participate in physical exam. NAD, no complaints.    On further ROS, patient did not have complaint of: Headache, Blurred Vision, Cough, Dyspnea, Palpitations, Abdominal Pain, N/V, Weakness, Change in Sensation.     VITAL SIGNS:  T(F): 97.6 (09-30-20 @ 07:59)  HR: 87 (09-30-20 @ 07:59)  BP: 109/73 (09-30-20 @ 07:59)  RR: 18 (09-30-20 @ 07:59)  SpO2: 98% (09-30-20 @ 07:59)  Wt(kg): --    Input & Output:      PHYSICAL EXAM:  Constitutional: Patient seated comfortably on edge of bed, of appropriate color, nutrition, and hydration.   HEENT: PERRLA, Normal Range of eye movement   Back: No CVA tenderness  Respiratory: Normal air entry, Lungs clear to auscultation but poor inspiratory effort.   Cardiovascular: RRR, no appreciable murmurs.  Gastrointestinal: BS+, soft, NT/ND  Extremities: No peripheral edema  Vascular: 2+ dp and radial b/l peripheral pulses  Neurological: A/O x 1 to self only  Musculoskeletal: moving all limbs spontaneously but unwilling to participate in exam  Skin: No rashes    MEDICATIONS  (STANDING):  enoxaparin Injectable 40 milliGRAM(s) SubCutaneous every 24 hours  multivitamin  Chewable 1 Tablet(s) Oral daily  QUEtiapine 50 milliGRAM(s) Oral every 12 hours    MEDICATIONS  (PRN):      Allergies    No Known Allergies    Intolerances        LABS:                        14.4   10.28 )-----------( 242      ( 28 Sep 2020 11:02 )             44.4     09-28    139  |  101  |  10  ----------------------------<  122<H>  3.8   |  25  |  0.62    Ca    9.5      28 Sep 2020 11:02  Mg     1.9     09-28            RADIOLOGY & ADDITIONAL TESTS:

## 2020-09-30 NOTE — PROGRESS NOTE ADULT - PROBLEM SELECTOR PLAN 5
F: No IVF  E: Replete PRN  N: mech soft w/ thin liquids  DVT Ppx: Lovenox  Code; FULL CODE   Dispo: [facility] F: No IVF  E: Replete PRN  N: mech soft w/ thin liquids  DVT Ppx: Lovenox  Code; FULL CODE   Dispo: [facility], possible [facility] for discharge

## 2020-09-30 NOTE — PROGRESS NOTE BEHAVIORAL HEALTH - ADDITIONAL DETAILS / COMMENTS
The patient did not appropriately answer the questions asked of her. Occasionally, she would begin to answer a question and then begin speaking tangentially about something else, never returning to the original topic. She was difficult to understand due to poor enunciation.
The patient did not appropriately answer the questions asked of her. Occasionally, she would begin to answer a question and then begin speaking tangentially about something else, never returning to the original topic. She was difficult to understand due to poor enunciation.

## 2020-09-30 NOTE — PROGRESS NOTE ADULT - PROBLEM SELECTOR PLAN 3
Patient w/ reported fall on 9/27 w/o obvious trauma. CT head without acute bleed  - PT when AMS stable Patient w/ reported fall on 9/27 w/o obvious trauma. CT head without acute bleed  - PT when AMS stable  - bed alarm and enhanced observation

## 2020-10-01 ENCOUNTER — TRANSCRIPTION ENCOUNTER (OUTPATIENT)
Age: 80
End: 2020-10-01

## 2020-10-01 LAB
ANION GAP SERPL CALC-SCNC: 10 MMOL/L — SIGNIFICANT CHANGE UP (ref 5–17)
BUN SERPL-MCNC: 18 MG/DL — SIGNIFICANT CHANGE UP (ref 7–23)
CALCIUM SERPL-MCNC: 8.8 MG/DL — SIGNIFICANT CHANGE UP (ref 8.4–10.5)
CHLORIDE SERPL-SCNC: 105 MMOL/L — SIGNIFICANT CHANGE UP (ref 96–108)
CO2 SERPL-SCNC: 26 MMOL/L — SIGNIFICANT CHANGE UP (ref 22–31)
CREAT SERPL-MCNC: 0.71 MG/DL — SIGNIFICANT CHANGE UP (ref 0.5–1.3)
GLUCOSE SERPL-MCNC: 95 MG/DL — SIGNIFICANT CHANGE UP (ref 70–99)
HCT VFR BLD CALC: 36 % — SIGNIFICANT CHANGE UP (ref 34.5–45)
HGB BLD-MCNC: 11.5 G/DL — SIGNIFICANT CHANGE UP (ref 11.5–15.5)
MCHC RBC-ENTMCNC: 29.3 PG — SIGNIFICANT CHANGE UP (ref 27–34)
MCHC RBC-ENTMCNC: 31.9 GM/DL — LOW (ref 32–36)
MCV RBC AUTO: 91.8 FL — SIGNIFICANT CHANGE UP (ref 80–100)
NRBC # BLD: 0 /100 WBCS — SIGNIFICANT CHANGE UP (ref 0–0)
PLATELET # BLD AUTO: 242 K/UL — SIGNIFICANT CHANGE UP (ref 150–400)
POTASSIUM SERPL-MCNC: 4.4 MMOL/L — SIGNIFICANT CHANGE UP (ref 3.5–5.3)
POTASSIUM SERPL-SCNC: 4.4 MMOL/L — SIGNIFICANT CHANGE UP (ref 3.5–5.3)
RBC # BLD: 3.92 M/UL — SIGNIFICANT CHANGE UP (ref 3.8–5.2)
RBC # FLD: 13.4 % — SIGNIFICANT CHANGE UP (ref 10.3–14.5)
SODIUM SERPL-SCNC: 141 MMOL/L — SIGNIFICANT CHANGE UP (ref 135–145)
WBC # BLD: 5.09 K/UL — SIGNIFICANT CHANGE UP (ref 3.8–10.5)
WBC # FLD AUTO: 5.09 K/UL — SIGNIFICANT CHANGE UP (ref 3.8–10.5)

## 2020-10-01 PROCEDURE — 99231 SBSQ HOSP IP/OBS SF/LOW 25: CPT

## 2020-10-01 PROCEDURE — 99233 SBSQ HOSP IP/OBS HIGH 50: CPT | Mod: GC

## 2020-10-01 RX ORDER — OLANZAPINE 15 MG/1
2.5 TABLET, FILM COATED ORAL ONCE
Refills: 0 | Status: DISCONTINUED | OUTPATIENT
Start: 2020-10-01 | End: 2020-10-01

## 2020-10-01 RX ORDER — QUETIAPINE FUMARATE 200 MG/1
1 TABLET, FILM COATED ORAL
Qty: 0 | Refills: 0 | DISCHARGE
Start: 2020-10-01

## 2020-10-01 RX ORDER — OLANZAPINE 15 MG/1
2.5 TABLET, FILM COATED ORAL ONCE
Refills: 0 | Status: COMPLETED | OUTPATIENT
Start: 2020-10-01 | End: 2020-10-01

## 2020-10-01 RX ADMIN — OLANZAPINE 2.5 MILLIGRAM(S): 15 TABLET, FILM COATED ORAL at 14:57

## 2020-10-01 RX ADMIN — ENOXAPARIN SODIUM 40 MILLIGRAM(S): 100 INJECTION SUBCUTANEOUS at 06:47

## 2020-10-01 RX ADMIN — QUETIAPINE FUMARATE 50 MILLIGRAM(S): 200 TABLET, FILM COATED ORAL at 22:15

## 2020-10-01 RX ADMIN — Medication 1 TABLET(S): at 09:57

## 2020-10-01 RX ADMIN — QUETIAPINE FUMARATE 50 MILLIGRAM(S): 200 TABLET, FILM COATED ORAL at 09:57

## 2020-10-01 NOTE — DISCHARGE NOTE PROVIDER - PROVIDER TOKENS
FREE:[LAST:[Clinic],PHONE:[(   )    -],FAX:[(   )    -],ADDRESS:[Patient needs f/u appt with [facility]-has no PCP],FOLLOWUP:[2 weeks]] FREE:[LAST:[[facility]],PHONE:[(   )    -],FAX:[(   )    -],ADDRESS:[Follow up with Subacute Rehabilitation Physician],FOLLOWUP:[2 weeks]] FREE:[LAST:[[facility]],PHONE:[(   )    -],FAX:[(   )    -],ADDRESS:[Follow up with Subacute Rehabilitation Physician],FOLLOWUP:[2 weeks]],PROVIDER:[TOKEN:[[id number]]]

## 2020-10-01 NOTE — DISCHARGE NOTE PROVIDER - CARE PROVIDER_API CALL
Clinic,   Patient needs f/u appt with [facility]-has no PCP  Phone: (   )    -  Fax: (   )    -  Follow Up Time: 2 weeks   [facility],   Follow up with Subacute Rehabilitation Physician  Phone: (   )    -  Fax: (   )    -  Follow Up Time: 2 weeks   [facility],   Follow up with Subacute Rehabilitation Physician  Phone: (   )    -  Fax: (   )    -  Follow Up Time: 2 weeks    Jakob Vaughn  INTERNAL MEDICINE  [address] [place] [address]  Phone: [phone]  Fax: [phone]  Follow Up Time:

## 2020-10-01 NOTE — PROGRESS NOTE BEHAVIORAL HEALTH - SUMMARY
Patient is a 81 y/o woman with no known PPH, history of dementia,  not on any medication, brought in to the ED by friend for failure to thrive and agitation. Psychiatry was consulted to evaluate patient's capacity to participate in discharge planning and make recommendations for treatment of agitation. Patient currently presents very sedated after multiple dosages of prns and cannot participate in a full evaluation. As per collaterals and chart review patient presents at baseline AAOx2-3 and has been deteriorating for the last 3 years. It is unclear what precipitated her agitation prior to admission (medical workup for r/o delirium until now has been negative). In the hospital patient received 2 dosages of lorazepam which likely worsened her agitation.   Plan:  -continue seroquel to 50mg po qhs   -continue olanzapine 2.5mg po/im q6h prn agitation  (monitor the EKG for qtc prolongation)  -avoid benzodiazepines/anticholinergics/ other medications that can precipitate delirium/agitation in the geriatric patient population   -at this time patient does not have capacity to participate in discharge planning (rehab); please defer to HCP
Patient is a 79 y/o woman with no known PPH, history of dementia,  not on any medication, brought in to the ED by friend for failure to thrive and agitation. Psychiatry was consulted to evaluate patient's capacity to participate in discharge planning and make recommendations for treatment of agitation. Patient currently presents very sedated after multiple dosages of prns and cannot participate in a full evaluation. As per collaterals and chart review patient presents at baseline AAOx2-3 and has been deteriorating for the last 3 years. It is unclear what precipitated her agitation prior to admission (medical workup for r/o delirium until now has been negative). In the hospital patient received 2 dosages of lorazepam which likely worsened her agitation.   Plan:  -continue seroquel to 50mg po qhs   -continue olanzapine 2.5mg po/im q6h prn agitation  (monitor the EKG for qtc prolongation)  -avoid benzodiazepines/anticholinergics/ other medications that can precipitate delirium/agitation in the geriatric patient population   -medical workup for r/o medical causes of delirium as per primary team  -at this time patient does not have capacity to participate in discharge planning (rehab); please defer to HCP  -CL to follow
Patient is a 79 y/o woman with no known PPH, history of dementia,  not on any medication, brought in to the ED by friend for failure to thrive and agitation. Psychiatry was consulted to evaluate patient's capacity to participate in discharge planning and make recommendations for treatment of agitation. Patient currently presents very sedated after multiple dosages of prns and cannot participate in a full evaluation. As per collaterals and chart review patient presents at baseline AAOx2-3 and has been deteriorating for the last 3 years. It is unclear what precipitated her agitation prior to admission (medical workup for r/o delirium until now has been negative). In the hospital patient received 2 dosages of lorazepam which likely worsened her agitation.   Plan:  -continue seroquel to 50mg po qhs   -continue olanzapine 2.5mg po/im q6h prn agitation  (monitor the EKG for qtc prolongation)  -avoid benzodiazepines/anticholinergics/ other medications that can precipitate delirium/agitation in the geriatric patient population   -medical workup for r/o medical causes of delirium as per primary team  -at this time patient does not have capacity to participate in discharge planning (rehab); please defer to HCP  -CL to follow

## 2020-10-01 NOTE — DISCHARGE NOTE PROVIDER - NSDCMRMEDTOKEN_GEN_ALL_CORE_FT
Multiple Vitamins oral tablet, chewable: 1 tab(s) orally once a day  QUEtiapine 50 mg oral tablet: 1 tab(s) orally every 12 hours

## 2020-10-01 NOTE — DISCHARGE NOTE PROVIDER - NSFOLLOWUPCLINICS_GEN_ALL_ED_FT
[facility]  Family Medicine  [address]  [place], [place] [address]  Phone: [phone]  Fax:   Follow Up Time: 2 weeks

## 2020-10-01 NOTE — PROGRESS NOTE BEHAVIORAL HEALTH - NSBHFUPINTERVALCCFT_PSY_A_CORE
Patient is a 79 y/o woman with no known PPH, history of dementia,  not on any medication, brought in to the ED by friend for failure to thrive and agitation. Psychiatry was consulted to evaluate patient's capacity to participate in discharge planning and make recommendations for treatment of agitation. Patient currently presents very sedated after multiple dosages of prns and cannot participate in a full evaluation. As per collaterals and chart review patient presents at baseline AAOx2-3 and has been deteriorating for the last 3 years. It is unclear what precipitated her agitation prior to admission (medical workup for r/o delirium until now has been negative). In the hospital patient received 2 dosages of lorazepam which likely worsened her agitation.
Patient is a 81 y/o woman with no known PPH, history of dementia,  not on any medication, brought in to the ED by friend for failure to thrive and agitation. Psychiatry was consulted to evaluate patient's capacity to participate in discharge planning and make recommendations for treatment of agitation. Patient currently presents very sedated after multiple dosages of prns and cannot participate in a full evaluation. As per collaterals and chart review patient presents at baseline AAOx2-3 and has been deteriorating for the last 3 years. It is unclear what precipitated her agitation prior to admission (medical workup for r/o delirium until now has been negative). In the hospital patient received 2 dosages of lorazepam which likely worsened her agitation.
Patient is a 81 y/o woman with no known PPH, history of dementia,  not on any medication, brought in to the ED by friend for failure to thrive and agitation. Psychiatry was consulted to evaluate patient's capacity to participate in discharge planning and make recommendations for treatment of agitation. Patient currently presents very sedated after multiple dosages of prns and cannot participate in a full evaluation. As per collaterals and chart review patient presents at baseline AAOx2-3 and has been deteriorating for the last 3 years. It is unclear what precipitated her agitation prior to admission (medical workup for r/o delirium until now has been negative). In the hospital patient received 2 dosages of lorazepam which likely worsened her agitation.

## 2020-10-01 NOTE — DISCHARGE NOTE PROVIDER - HOSPITAL COURSE
***INCOMPLETE***  #Discharge: do not delete    Patient is __ yo M/F with past medical history of _____ admitted for _____, found to have _____    (Example)   34 yo M with PMH of HTN, DM admitted with sepsis 2/2 LLE cellulitis, complicated by HIRA, stable for DC home with PO Keflex to complete by 6/12/19.)    Problem List/Main Diagnoses (system-based):     (Example:)  1. Cellulitis: Left lower extremity, improved during stay w/ ABx Tx    2. HIRA: stabilized with IVF    3. HTN: No change to regimen    4: DM: No change to regimen      Inpatient treatment course: (Example): IV Vancomycin and Zosyn on admission; IVF     New medications: (Example): 5 day course of Keflex until 6/12/2019     Labs to be followed outpatient: (Example): WBC, BMP    Exam to be followed outpatient: (Example): Check LLE swelling/erythema, outlined with skin marker ***INCOMPLETE***    80F, poor historian, with no known medical history with exception of dementia, last saw PCP 10 years ago, not on any medication, brought in to the ED by friend for acute on chronic dementia w/ behavioral disturbance, admitted for Failure to Thrive, likely 2/2 progressive worsening cognitive decline/dementia. Stable for DC to [facility]      Problem List:     1. Failure to Thrive: Patient appeared malnurished and unkempt on presentation to ED, determined to be unable to take care of self, likely 2/2 progressive worsening cognitive decline/dementia. Evaluated by Nutrition, PT, and SLP for swallow assessment. Nutrition supplemented with multivitamin and ensure Enlive TID, with remainder of diet mechanical soft w/ thin liquids per SLP. PT recommends [facility] on discharge.    2. Dementia with behavioral problem: Pt with longstanding history of dementia, in the ED was very agitated and incoherent. RPR neg, B12, folate, and TSH wnl. Psych evaluated and recommended continuation of Seroquel 50 mg BID with olanzapine for breakthrough agitation. QTc remains wnl per EKG. Patient currently on enhanced observation with no need for olanzapine overnight, no agitation, and stable for discharge to [facility] for further management with recommendation to involve psychiatry for future care plan.    3.  Fall: Patient w/ reported fall on 9/27 w/o obvious trauma. CT head without acute bleed, currently on enhanced observation with bed alarm, recommended PT at [facility].        Inpatient treatment course: 80F, poor historian, with no known medical history with exception of dementia, last saw PCP 10 years ago, not on any medication, brought in to the ED by friend for acute on chronic dementia w/ behavioral disturbance, admitted for Failure to Thrive, likely 2/2 progressive worsening cognitive decline/dementia. In the ED was given 4 mg Ativan for agitation with appropriate response and 2 L NS for reconstitution. CTH performed, as patient reported a recent fall, which showed no acute bleed. RPR neg, B12, folate, and TSH wnl. Psych evaluated and recommended Seroquel 50 mg BID with olanzapine for breakthrough agitation. QTc remained wnl per EKG. Evaluated by Nutrition, PT, and SLP for swallow assessment. Nutrition supplemented with Multivitamin and ensure Enlive TID, with remainder of diet mechanical soft w/ thin liquids per SLP. PT recommends [facility] on discharge.     New medications: Seroquel 50 mg BID, Multivitamin     Labs to be followed outpatient: None    Exam to be followed outpatient: None ***INCOMPLETE***    80F, poor historian, with no known medical history with exception of dementia, last saw PCP 10 years ago, not on any medication, brought in to the ED by friend for acute on chronic dementia w/ behavioral disturbance, admitted for Failure to Thrive, likely 2/2 progressive worsening cognitive decline/dementia. Stable for DC to [facility].      Problem List:     1. Failure to Thrive: Patient appeared malnurished and unkempt on presentation to ED, determined to be unable to take care of self, likely 2/2 progressive worsening cognitive decline/dementia. Evaluated by Nutrition, PT, and SLP for swallow assessment. Nutrition supplemented with multivitamin and ensure Enlive TID, with remainder of diet mechanical soft w/ thin liquids per SLP. PT recommends [facility] on discharge.    2. Dementia with behavioral problem: Pt with longstanding history of dementia, in the ED was very agitated and incoherent. RPR neg, B12, folate, and TSH wnl. Psych evaluated and recommended continuation of Seroquel 50 mg BID with olanzapine for breakthrough agitation. QTc remains wnl per EKG. Patient currently on enhanced observation with no need for olanzapine overnight, no agitation, and stable for discharge to [facility] for further management with recommendation to involve psychiatry for future care plan.    3.  Fall: Patient w/ reported fall on 9/27 w/o obvious trauma. CT head without acute bleed, currently on enhanced observation with bed alarm, recommended PT at [facility].        Inpatient treatment course: 80F, poor historian, with no known medical history with exception of dementia, last saw PCP 10 years ago, not on any medication, brought in to the ED by friend for acute on chronic dementia w/ behavioral disturbance, admitted for Failure to Thrive, likely 2/2 progressive worsening cognitive decline/dementia. In the ED was given 4 mg Ativan for agitation with appropriate response and 2 L NS for reconstitution. CTH performed, as patient reported a recent fall, which showed no acute bleed. RPR neg, B12, folate, and TSH wnl. Psych evaluated and recommended Seroquel 50 mg BID with olanzapine for breakthrough agitation. QTc remained wnl per EKG. Evaluated by Nutrition, PT, and SLP for swallow assessment. Nutrition supplemented with Multivitamin and ensure Enlive TID, with remainder of diet mechanical soft w/ thin liquids per SLP. PT recommends [facility] on discharge.     New medications: Seroquel 50 mg BID, Multivitamin     Labs to be followed outpatient: None    Exam to be followed outpatient: None 80F, poor historian, with no known medical history with exception of dementia, last saw PCP 10 years ago, not on any medication, brought in to the ED by friend for acute on chronic dementia w/ behavioral disturbance, admitted for Failure to Thrive, likely 2/2 progressive worsening cognitive decline/dementia. Stable for DC to [facility].      Problem List:     1. Failure to Thrive: Patient appeared malnurished and unkempt on presentation to ED, determined to be unable to take care of self, likely 2/2 progressive worsening cognitive decline/dementia. Evaluated by Nutrition, PT, and SLP for swallow assessment. Nutrition supplemented with multivitamin and ensure Enlive TID, with remainder of diet mechanical soft w/ thin liquids per SLP. PT recommends [facility] on discharge.    2. Dementia with behavioral problem: Pt with longstanding history of dementia, in the ED was very agitated and incoherent. RPR neg, B12, folate, and TSH wnl. Psych evaluated and recommended continuation of Seroquel 50 mg BID with olanzapine for breakthrough agitation. QTc remains wnl per EKG. Patient currently on enhanced observation with no need for olanzapine overnight, no agitation, and stable for discharge to [facility] for further management with recommendation to involve psychiatry for future care plan.    3.  Fall: Patient w/ reported fall on 9/27 w/o obvious trauma. CT head without acute bleed, currently on enhanced observation with bed alarm, recommended PT at [facility].        Inpatient treatment course: 80F, poor historian, with no known medical history with exception of dementia, last saw PCP 10 years ago, not on any medication, brought in to the ED by friend for acute on chronic dementia w/ behavioral disturbance, admitted for Failure to Thrive, likely 2/2 progressive worsening cognitive decline/dementia. In the ED was given 4 mg Ativan for agitation with appropriate response and 2 L NS for reconstitution. CTH performed, as patient reported a recent fall, which showed no acute bleed. RPR neg, B12, folate, and TSH wnl. Psych evaluated and recommended Seroquel 50 mg BID with olanzapine for breakthrough agitation. QTc remained wnl per EKG. Evaluated by Nutrition, PT, and SLP for swallow assessment. Nutrition supplemented with Multivitamin and ensure Enlive TID, with remainder of diet mechanical soft w/ thin liquids per SLP. PT recommends [facility] on discharge.     New medications: Seroquel 50 mg BID, Multivitamin     Labs to be followed outpatient: None    Exam to be followed outpatient: None 80F, poor historian, with no known medical history with exception of dementia, last saw PCP 10 years ago, not on any medication, brought in to the ED by friend for acute on chronic dementia w/ behavioral disturbance, admitted for Failure to Thrive, likely 2/2 progressive worsening cognitive decline/dementia. Stable for DC to [facility].      Problem List:     1. Failure to Thrive: Patient appeared malnurished and unkempt on presentation to ED, determined to be unable to take care of self, likely 2/2 progressive worsening cognitive decline/dementia. Evaluated by Nutrition, PT, and SLP for swallow assessment. Nutrition supplemented with multivitamin and ensure Enlive TID, with remainder of diet mechanical soft w/ thin liquids per SLP. PT recommended [facility] on discharge.    2. Dementia with behavioral problem: Pt with longstanding history of dementia, in the ED was very agitated and incoherent. RPR neg, B12, folate, and TSH wnl. Psych evaluated and recommended continuation of Seroquel 50 mg BID with olanzapine for breakthrough agitation. QTc remains wnl per EKG (450). Patient currently on enhanced observation with no need for olanzapine overnight, no agitation, and stable for discharge to [facility] for further management with recommendation to involve psychiatry for future care plan.    3.  Fall: Patient w/ reported fall on 9/27 w/o obvious trauma. CT head without acute bleed, currently on enhanced observation with bed alarm, recommended PT at [facility].      New medications: Seroquel 50 mg BID, Multivitamin  Labs to be followed outpatient: None  Exam to be followed outpatient: EKG for QTC check

## 2020-10-01 NOTE — PROGRESS NOTE ADULT - PROBLEM SELECTOR PLAN 2
Pt with longstanding history of dementia, in the ED, was very agitated and incoherent.   - RPR neg  - B12, folate, TSH wnl  - Psych on consult, appreciate further recs  - Seroquel 50mg qHS  - Discontinued olanzapine this AM, ordered EKG to f/u QTc before reordering if needed.  - Patient on enhanced Observation now, has been noncombative overnight Pt with longstanding history of dementia, in the ED, was very agitated and incoherent.   - RPR neg  - B12, folate, TSH wnl  - Psych on consult, appreciate further recs  - Seroquel 50mg qHS  - QTc wnl if olanzapine needed, only used once yesterday.  - Patient on enhanced Observation now, has been noncombative with limited agitation overnight

## 2020-10-01 NOTE — PROGRESS NOTE BEHAVIORAL HEALTH - AFFECT QUALITY
You were seen in the Emergency Department for lightheadedness and chest pain.    EKG, labs, chest xray were completed without significant acute abnormalities.    Please use 1,000mg of tylenol every 6 hours as needed for pain.  Ensure you are drinking plenty of fluids.    Please follow up with your primary care physician.    Return to the Emergency Department with fevers, worsening chest pain, trouble breathing, fainting, or other concerns.    
Irritable

## 2020-10-01 NOTE — PROGRESS NOTE ADULT - PROBLEM SELECTOR PLAN 3
Patient w/ reported fall on 9/27 w/o obvious trauma. CT head without acute bleed  - PT when AMS stable  - bed alarm and enhanced observation Patient w/ reported fall on 9/27 w/o obvious trauma. CT head without acute bleed  - PT at [facility] once AMS stable  - bed alarm and enhanced observation

## 2020-10-01 NOTE — PROGRESS NOTE BEHAVIORAL HEALTH - NSBHFUPINTERVALHXFT_PSY_A_CORE
Patient seen at bedside. she presents calm, pleasant. She is not able to state that she is in the hospital nor the season/month/date. 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

## 2020-10-01 NOTE — DISCHARGE NOTE PROVIDER - NSDCFUADDAPPT_GEN_ALL_CORE_FT
Follow up with [facility] physician 1. Please follow-up with the [facility] physician within 1 week of discharge from the hospital.     2. Please try to establish care with a primary care physician at [facility], located at [address] [place] [address] by calling [phone].

## 2020-10-01 NOTE — PROGRESS NOTE ADULT - PROBLEM SELECTOR PLAN 4
Lipase minimally elevated to 71, no other signs of pancreatitis.

## 2020-10-01 NOTE — PROGRESS NOTE BEHAVIORAL HEALTH - RISK ASSESSMENT
low risk: future oriented, no history of self harm

## 2020-10-01 NOTE — DISCHARGE NOTE PROVIDER - NSDCHC_MEDRECSTATUS_GEN_ALL_CORE
SUBJECTIVE:  Patient is a 31 year old  female  who presents with purulent rhinnorhea, nasal congestion and sinus pressure and pain and greenish yellow productive cough.  Onset 2 weeks ago and the symptoms have been gradual and worsening.  She  has no history of: significant recurrent respiratory illnesses.  She  is a non-smoker.    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 1 Inhaler 0   • promethazine-dextromethorphan (PROMETHAZINE-DM) 6.25-15 MG/5ML syrup 1-2 tsp po every 8hrs as needed for cough; sedation precautions while on medication 140 mL 0   • azithromycin (ZITHROMAX) 500 MG tablet Take 1 tablet by mouth daily. 5 tablet 0   • loratadine (CLARITIN) 10 MG tablet 1 tab po bid X 4 days and then 1 tab po qd 30 tablet 0   • amphetamine-dextroamphetamine (ADDERALL) 30 MG tablet Take 1 tablet by mouth 3 times daily. 90 tablet 0   • Valacyclovir HCl 1000 MG Tab Take 2 tabs in AM and 2 tabs in PM for one day as needed cold sores 24 tablet 0   • albuterol 108 (90 Base) MCG/ACT inhaler Inhale 2 puffs into the lungs every 4 hours as  needed for Shortness of Breath or Wheezing (dispense and use with a spacer). 1 Inhaler 0   • Loratadine (CLARITIN) 10 MG Cap Take 1 tablet by mouth daily.     • Cholecalciferol (VITAMIN D3) 2000 UNITS capsule Take 5,000 Units by mouth daily.      • cyanocobalamin (VITAMIN B-12) 250 MCG tablet Take 250 mcg by mouth daily.       No current facility-administered medications for this visit.        PHYSICAL EXAM:    APPEARANCE:  Healthy, alert, in no distress; pulse oximetry 99% ra;  HEAD: Normocephalic. No masses, lesions, tenderness or abnormalities.  EYES: Pupils equal, round, reactive to light and accommodation, normal extraocular movements and fundoscopic examination normal.  EARS: Boggy bilateral tympanic membrane(s).  NOSE: Edematous nasal mucosa, erythematous nasal mucosa, occluded with yellow nasal discharge and tenderness present in maxillary  sinus areas.  THROAT: Yellow postnasal drip and mild redness;  NECK: Neck supple. No masses. No adenopathy.  No JVD; negative meningeal signs.  LUNGS: Few scattered expiratory rhonchi and no expiratory wheezes.  HEART: Regular rate and rhythm, S1 and S2 normal and with no gallops or murmurs.  EXTREMITIES: There is no edema.    ASSESSMENT:  (J40) Bronchitis  (primary encounter diagnosis)  (J01.80) Other acute sinusitis, recurrence not specified  PLAN:  >Rest and increase activity as tolerated.  >If symptoms are not improved in 3 days or if they worsen should recheck immediately either with pmd/wic.  >Tylenol for fever prn.  >Medication as ordered below.  >Warm saline gargles prn and liberal fluid intake.  >Seek medical attention immediately in case of worsening of symptoms.     Admission Reconciliation is Not Complete  Discharge Reconciliation is Not Complete Admission Reconciliation is Completed  Discharge Reconciliation is Completed Admission Reconciliation is Completed  Discharge Reconciliation is Not Complete

## 2020-10-01 NOTE — PROGRESS NOTE ADULT - PROBLEM SELECTOR PROBLEM 2
Dementia with behavioral problem

## 2020-10-01 NOTE — DISCHARGE NOTE PROVIDER - NSDCCPCAREPLAN_GEN_ALL_CORE_FT
PRINCIPAL DISCHARGE DIAGNOSIS  Diagnosis: Failure to thrive in adult  Assessment and Plan of Treatment: You presented to the ED in a state of malnutrition. During your hospitalization, you were evaluated by a nutritionist and it was recommended that you take a multivitamin once a day along with Ensure Enlive 3 times a day with your regular meals. A speech and language pathologist also evaluated you and determined you may have trouble eating solid foods, so it is recommended you eat only soft foods and thin liquids. On discahrge you will be going to a subacute Rehabilitation facility where you will have help managing your nutrition.  If you feel weak, lightheaded, have difficulty standing or maintaining your balance, please go to the nearest emergency room.      SECONDARY DISCHARGE DIAGNOSES  Diagnosis: Dementia with behavioral disturbance, unspecified dementia type  Assessment and Plan of Treatment: You presented to the ED incoherent in a state of agitation and confusion and were determined to have an acute exacerbation of your longstanding dementia. It is imperative that you remain at the subacute Rehabilitation facility once you are discharged and take your medications as prescribed. In the hospital you received a medication called Seroquel for this disorder and it is recommended that you continue to take this medicine, twice a day, once you are discharged from the hospital.  If you become disoriented, confused, frustrated, or lost, please ask for help to the nearest emergency room.     PRINCIPAL DISCHARGE DIAGNOSIS  Diagnosis: Failure to thrive in adult  Assessment and Plan of Treatment: You presented to the ED in a state of malnutrition. During your hospitalization, you were evaluated by a nutritionist and it was recommended that you take a multivitamin once a day along with Ensure Enlive 3 times a day with your regular meals. A speech and language pathologist also evaluated you and determined you may have trouble eating solid foods, so it is recommended you eat only soft foods and thin liquids. On discahrge you will be going to a subacute Rehabilitation facility where you will have help managing your nutrition.  If you feel weak, lightheaded, have difficulty standing or maintaining your balance, please go to the nearest emergency room.      SECONDARY DISCHARGE DIAGNOSES  Diagnosis: Dementia with behavioral disturbance, unspecified dementia type  Assessment and Plan of Treatment: You presented to the ED incoherent in a state of agitation and confusion and were determined to have an acute exacerbation of your longstanding dementia. It is imperative that you remain at the subacute Rehabilitation facility once you are discharged and take your medications as prescribed. In the hospital you received a medication called Seroquel for this disorder and it is recommended that you continue to take this medicine, twice a day, once you are discharged from the hospital to the Subacute Rehabilitation Facility. You are strongly encouraged to follow up with a physician who can become your primary provider in the future. You have been scheduled to follow up with the [facility]   If you become disoriented, confused, frustrated, or lost, please ask for help to the nearest emergency room.     PRINCIPAL DISCHARGE DIAGNOSIS  Diagnosis: Failure to thrive in adult  Assessment and Plan of Treatment: You presented to the ED in a state of malnutrition. During your hospitalization, you were evaluated by a nutritionist and it was recommended that you take a multivitamin once a day along with Ensure Enlive 3 times a day with your regular meals. A speech and language pathologist also evaluated you and determined you may have trouble eating solid foods, so it is recommended you eat only soft foods and thin liquids. On discahrge you will be going to a subacute Rehabilitation facility where you will have help managing your nutrition.  If you feel weak, lightheaded, have difficulty standing or maintaining your balance, please go to the nearest emergency room.      SECONDARY DISCHARGE DIAGNOSES  Diagnosis: Dementia with behavioral disturbance, unspecified dementia type  Assessment and Plan of Treatment: You presented to the ED incoherent in a state of agitation and confusion and were determined to have an acute exacerbation of your longstanding dementia. It is imperative that you remain at the subacute Rehabilitation facility once you are discharged and take your medications as prescribed. In the hospital you received a medication called Seroquel for this disorder and it is recommended that you continue to take this medicine, twice a day, once you are discharged from the hospital to the Subacute Rehabilitation Facility. You are strongly encouraged to follow up with a physician who can become your primary provider in the future. You will need to follow up with the [facility] within the next 2 weeks for continued care.  If you become disoriented, confused, frustrated, or lost, please ask for help to the nearest emergency room.     PRINCIPAL DISCHARGE DIAGNOSIS  Diagnosis: Failure to thrive in adult  Assessment and Plan of Treatment: You presented to the ED in a state of malnutrition. During your hospitalization, you were evaluated by a nutritionist and it was recommended that you take a multivitamin once a day along with Ensure Enlive 3 times a day with your regular meals. A speech and language pathologist also evaluated you and determined you may have trouble eating solid foods, so it is recommended you eat only soft foods and thin liquids. On discahrge you will be going to a subacute ehabilitation facility where you will have help managing your nutrition. If you feel weak, lightheaded, have difficulty standing or maintaining your balance, please go to the nearest emergency room.      SECONDARY DISCHARGE DIAGNOSES  Diagnosis: Dementia with behavioral disturbance, unspecified dementia type  Assessment and Plan of Treatment: You presented to the ED incoherent in a state of agitation and confusion and were determined to have an acute exacerbation of your longstanding dementia. It is imperative that you remain at the subacute Rehabilitation facility once you are discharged and take your medications as prescribed. In the hospital you received a medication called Seroquel for this disorder and it is recommended that you continue to take this medicine (50 mg), twice a day, once you are discharged from the hospital to the Subacute Rehabilitation Facility. You are strongly encouraged to follow-up with a physician who can become your primary provider in the future. One possiblity would be to follow up with the [facility] within the next 2 weeks for continued care. If you become disoriented, confused, frustrated, or lost, please ask for help to the nearest emergency room.

## 2020-10-01 NOTE — PROGRESS NOTE ADULT - SUBJECTIVE AND OBJECTIVE BOX
***INCOMPLETE***  INTERVAL HPI/OVERNIGHT EVENTS:      On further ROS, patient did not have complaint of: Headache, Blurred Vision, Cough, Dyspnea, Palpitations, Abdominal Pain, N/V, Weakness, Change in Sensation.     VITAL SIGNS:  T(F): 97.6 (10-01-20 @ 06:54)  HR: 83 (10-01-20 @ 06:54)  BP: 104/71 (10-01-20 @ 06:54)  RR: 19 (10-01-20 @ 06:54)  SpO2: 96% (10-01-20 @ 06:54)  Wt(kg): --    Input & Output:      PHYSICAL EXAM:  Constitutional: Patient seated comfortably in bed, of appropriate color, nutrition, and hydration.   HEENT: PERRLA, Normal Range of eye movement with no complaint of diplopia, Normal Hearing  Neck: No LAD, No JVD  Back: Normal spine flexure, No CVA tenderness  Respiratory: Normal air entry, Lungs clear to auscultation b/l w/o wheeze or crepitations.   Cardiovascular: S1 and S2 present - no additional abnormal sounds or murmurs. Normal rhythm and rate of pulse.   Gastrointestinal: BS+, soft, NT/ND  Extremities: No peripheral edema  Vascular: 2+ peripheral pulses  Neurological: A/O x 3, CN V-XII grossly intact.  Psychiatric: Normal mood, normal affect  Musculoskeletal: 5/5 strength b/l upper and lower extremities  Skin: No rashes    MEDICATIONS  (STANDING):  enoxaparin Injectable 40 milliGRAM(s) SubCutaneous every 24 hours  multivitamin  Chewable 1 Tablet(s) Oral daily  QUEtiapine 50 milliGRAM(s) Oral every 12 hours    MEDICATIONS  (PRN):      Allergies    No Known Allergies    Intolerances        LABS:                        11.5   5.09  )-----------( 242      ( 01 Oct 2020 05:50 )             36.0     10-01    141  |  105  |  18  ----------------------------<  95  4.4   |  26  |  0.71    Ca    8.8      01 Oct 2020 05:50            RADIOLOGY & ADDITIONAL TESTS:   ***INCOMPLETE***  INTERVAL HPI/OVERNIGHT EVENTS:  SADE o/n. Patient did not require any olanzapine overnight; remained disoriented but without agitation or any aggression. Stable for d/c to [facility], awaiting placement.    On further ROS, patient did not have complaint of: Headache, Blurred Vision, Cough, Dyspnea, Palpitations, Abdominal Pain, N/V, Weakness, Change in Sensation.     VITAL SIGNS:  T(F): 97.6 (10-01-20 @ 06:54)  HR: 83 (10-01-20 @ 06:54)  BP: 104/71 (10-01-20 @ 06:54)  RR: 19 (10-01-20 @ 06:54)  SpO2: 96% (10-01-20 @ 06:54)  Wt(kg): --    Input & Output:      PHYSICAL EXAM:  Constitutional: Patient seated comfortably in bed, of appropriate color, nutrition, and hydration.   HEENT: PERRLA, Normal Range of eye movement with no complaint of diplopia, Normal Hearing  Neck: No LAD, No JVD  Back: Normal spine flexure, No CVA tenderness  Respiratory: Normal air entry, Lungs clear to auscultation b/l w/o wheeze or crepitations.   Cardiovascular: S1 and S2 present - no additional abnormal sounds or murmurs. Normal rhythm and rate of pulse.   Gastrointestinal: BS+, soft, NT/ND  Extremities: No peripheral edema  Vascular: 2+ peripheral pulses  Neurological: A/O x 3, CN V-XII grossly intact.  Psychiatric: Normal mood, normal affect  Musculoskeletal: 5/5 strength b/l upper and lower extremities  Skin: No rashes    MEDICATIONS  (STANDING):  enoxaparin Injectable 40 milliGRAM(s) SubCutaneous every 24 hours  multivitamin  Chewable 1 Tablet(s) Oral daily  QUEtiapine 50 milliGRAM(s) Oral every 12 hours    MEDICATIONS  (PRN):      Allergies    No Known Allergies    Intolerances        LABS:                        11.5   5.09  )-----------( 242      ( 01 Oct 2020 05:50 )             36.0     10-01    141  |  105  |  18  ----------------------------<  95  4.4   |  26  |  0.71    Ca    8.8      01 Oct 2020 05:50            RADIOLOGY & ADDITIONAL TESTS:   INTERVAL HPI/OVERNIGHT EVENTS:  SADE o/n. Patient did not require any olanzapine overnight; remained disoriented but without agitation or any aggression. Oriented to only self this AM, but pleasantly engaging and denies any physical symptoms. Stable for d/c to [facility], awaiting placement.    On further ROS, patient did not have complaint of: Headache, Blurred Vision, Cough, Dyspnea, Palpitations, Abdominal Pain, N/V, Weakness, Change in Sensation.     VITAL SIGNS:  T(F): 97.6 (10-01-20 @ 06:54)  HR: 83 (10-01-20 @ 06:54)  BP: 104/71 (10-01-20 @ 06:54)  RR: 19 (10-01-20 @ 06:54)  SpO2: 96% (10-01-20 @ 06:54)  Wt(kg): --    Input & Output:      PHYSICAL EXAM:  Constitutional: Patient seated comfortably on edge of bed, of appropriate color, nutrition, and hydration.   HEENT: PERRLA, Normal Range of eye movement   Back: No CVA tenderness  Respiratory: Normal air entry, Lungs clear to auscultation but poor inspiratory effort.   Cardiovascular: RRR, no appreciable murmurs.  Gastrointestinal: BS+, soft, NT/ND  Extremities: No peripheral edema  Vascular: 2+ dp and radial b/l peripheral pulses  Neurological: A/O x 1 to self only  Musculoskeletal: moving all limbs spontaneously but unwilling to participate in exam  Skin: No rashes    MEDICATIONS  (STANDING):  enoxaparin Injectable 40 milliGRAM(s) SubCutaneous every 24 hours  multivitamin  Chewable 1 Tablet(s) Oral daily  QUEtiapine 50 milliGRAM(s) Oral every 12 hours    MEDICATIONS  (PRN):      Allergies    No Known Allergies    Intolerances        LABS:                        11.5   5.09  )-----------( 242      ( 01 Oct 2020 05:50 )             36.0     10-01    141  |  105  |  18  ----------------------------<  95  4.4   |  26  |  0.71    Ca    8.8      01 Oct 2020 05:50            RADIOLOGY & ADDITIONAL TESTS:

## 2020-10-01 NOTE — PROGRESS NOTE ADULT - PROBLEM SELECTOR PLAN 1
Pt unable to take care of self, likely 2/2 progressive worsening cognitive decline/dementia. Pt appears malnourished and unkempt. No focal neuro deficits on exam, CT head negative.  - f/u PT consult- will likely need [facility] once mental status stable  - f/u CM/SW  - bedside swallow rec: mech soft w/ thin liquids  - Nutrition: recommend advancing diet to regular w/ ensure enlive TID w/ consistency per SLP recs, MVI, thiamine Pt unable to take care of self, likely 2/2 progressive worsening cognitive decline/dementia. Pt appeared malnourished and unkempt on admission. No focal neuro deficits on exam, CT head negative.  - Waiting for [facility] placement  - bedside swallow rec: mech soft w/ thin liquids  - Ensure enlive TID w/ MVI, thiamine

## 2020-10-01 NOTE — DISCHARGE NOTE PROVIDER - CARE PROVIDERS DIRECT ADDRESSES
,DirectAddress_Unknown ,DirectAddress_Unknown,[email or web address]

## 2020-10-01 NOTE — PROGRESS NOTE BEHAVIORAL HEALTH - THOUGHT PROCESS
Illogical/Disorganized/Circumstantial/Tangential
Tangential/Illogical/Disorganized/Circumstantial
Circumstantial/Tangential/Disorganized/Illogical

## 2020-10-01 NOTE — PROGRESS NOTE BEHAVIORAL HEALTH - NSBHADMITCOUNSEL_PSY_A_CORE
diagnostic results/impressions and/or recommended studies/instructions for management, treatment and follow up/risk factor reduction
diagnostic results/impressions and/or recommended studies/risk factor reduction/instructions for management, treatment and follow up
diagnostic results/impressions and/or recommended studies/instructions for management, treatment and follow up/risk factor reduction

## 2020-10-01 NOTE — PROGRESS NOTE ADULT - PROBLEM SELECTOR PLAN 5
F: No IVF  E: Replete PRN  N: mech soft w/ thin liquids  DVT Ppx: Lovenox  Code; FULL CODE   Dispo: [facility], possible [facility] for discharge

## 2020-10-02 ENCOUNTER — TRANSCRIPTION ENCOUNTER (OUTPATIENT)
Age: 80
End: 2020-10-02

## 2020-10-02 VITALS
SYSTOLIC BLOOD PRESSURE: 111 MMHG | DIASTOLIC BLOOD PRESSURE: 65 MMHG | TEMPERATURE: 98 F | RESPIRATION RATE: 17 BRPM | OXYGEN SATURATION: 98 % | HEART RATE: 97 BPM

## 2020-10-02 RX ADMIN — Medication 1 TABLET(S): at 11:48

## 2020-10-02 RX ADMIN — ENOXAPARIN SODIUM 40 MILLIGRAM(S): 100 INJECTION SUBCUTANEOUS at 06:00

## 2020-10-02 RX ADMIN — QUETIAPINE FUMARATE 50 MILLIGRAM(S): 200 TABLET, FILM COATED ORAL at 11:36

## 2020-10-02 NOTE — DISCHARGE NOTE NURSING/CASE MANAGEMENT/SOCIAL WORK - PATIENT PORTAL LINK FT
You can access the FollowMyHealth Patient Portal offered by [facility] by registering at the following website: [email or web address]. By joining "[email or web address]’s FollowMyHealth portal, you will also be able to view your health information using other applications (apps) compatible with our system.

## 2020-10-02 NOTE — PROGRESS NOTE ADULT - ASSESSMENT
80F, poor historian, with no known medical history with exception of dementia, last saw PCP 10 years ago, not on any medication, brought in to the ED by friend for failure to thrive and acute on chronic dementia w/ behavioral disturbance.
per Internal Medicine    80F, poor historian, with no known medical history with exception of dementia, last saw PCP 10 years ago, not on any medication, brought in to the ED by friend for failure to thrive and acute on chronic dementia w/ behavioral disturbance.    Problem/Plan - 1   ·  Problem: Failure to thrive in adult.  Plan: Pt unable to take care of self, likely 2/2 progressive worsening cognitive decline/dementia. Pt appeared malnourished and unkempt on admission. No focal neuro deficits on exam, CT head negative.  - Waiting for [facility] placement  - bedside swallow rec: mech soft w/ thin liquids  - Ensure enlive TID w/ MVI, thiamine.     Problem/Plan - 2   ·  Problem: Dementia with behavioral problem.  Plan: Pt with longstanding history of dementia, in the ED, was very agitated and incoherent.   - RPR neg  - B12, folate, TSH wnl  - Psych on consult, appreciate further recs  - Seroquel 50mg qHS  - QTc wnl if olanzapine needed, only used once yesterday.  - Patient on enhanced Observation now, has been noncombative with limited agitation overnight.     Problem/Plan - 3   ·  Problem: Fall.  Plan: Patient w/ reported fall on 9/27 w/o obvious trauma. CT head without acute bleed  - PT at [facility] once AMS stable  - bed alarm and enhanced observation.     Problem/Plan - 4   ·  Problem: Elevated lipase.  Plan: Lipase minimally elevated to 71, no other signs of pancreatitis.     Problem/Plan - 5   ·  Problem: Nutrition, metabolism, and development symptoms.  Plan: F: No IVF  E: Replete PRN  N: mech soft w/ thin liquids  DVT Ppx: Lovenox  Code; FULL CODE   Dispo: [facility], possible [facility] for discharge.
per Internal Medicine    80F, poor historian, with no known medical history with exception of dementia, last saw PCP 10 years ago, not on any medication, brought in to the ED by friend for failure to thrive and acute on chronic dementia w/ behavioral disturbance.    Problem/Plan - 1   ·  Problem: Failure to thrive in adult.  Plan: Pt unable to take care of self, likely 2/2 progressive worsening cognitive decline/dementia. Pt appears malnourished and unkempt. No focal neuro deficits on exam, CT head negative.  - f/u PT consult- will likely need [facility]  - f/u CM/SW  - bedside swallow rec: mech soft w/ thin liquids  - Nutrition: recommend advancing diet to regular w/ ensure enlive TID w/ consistency per SLP recs, MVI, thiamine.     Problem/Plan - 2   ·  Problem: Dementia with behavioral problem.  Plan: Pt with longstanding history of dementia, in the ED, was very agitated and incoherent.   - RPR neg  - B12, folate, TSH wnl  - consider Psych consult in AM for management   - avoid benzodiazepines, consider IM Zyprexa, IV Haldol, or Seroquel.     Problem/Plan - 3   ·  Problem: Fall.  Plan: Patient w/ reported fall on 9/27 w/o obvious trauma.  - f/u [facility] non con.     Problem/Plan - 4   ·  Problem: Elevated lipase.  Plan: Lipase minimally elevated to 71, no other signs of pancreatitis.     Problem/Plan - 5   ·  Problem: Nutrition, metabolism, and development symptoms.  Plan: F: No IVF  E: Replete PRN  N: mech soft w/ thin liquids  DVT Ppx: Lovenox  Code; FULL CODE   Dispo: F.   
80F, poor historian, with no known medical history with exception of dementia, last saw PCP 10 years ago, not on any medication, brought in to the ED by friend for failure to thrive and acute on chronic dementia w/ behavioral disturbance.

## 2020-10-02 NOTE — PROGRESS NOTE ADULT - SUBJECTIVE AND OBJECTIVE BOX
Physical Medicine and Rehabilitation Progress Note:    Patient is a 80y old  Female who presents with a chief complaint of failure to thrive (01 Oct 2020 11:02)      HPI:  80F, poor historian, with no known medical history with exception of dementia, last saw PCP 10 years ago, not on any medication, brought in to the ED by friend who states patient has been having a progressive cognitive decline and failure to thrive, however patient now is in a situation where her home is unlivable and not able to take care of herself. Today, patient was found to be naked with a bathrobe barely covering herself in a diner. She went on to yell at customers and diner employee called patient's friend, who ultimately brought her to the ED. Per chart review, patient refuses to see PCP.  Unable to obtain history from patient, as very lethargic and incoherent, after receiving 4 mg Ativan in the ED. Per chart review, patient was very disorganized, agitated, and did not make any coherent sentences. ROS unable to obtain.     In the ED: Initial vital signs: T: 98.4 F, HR: 95, BP: 127/57, R: 18, SpO2: 97% on RA  ED course: s/p 2mg Ativan x2, 1L NS  Labs: normal cbc/bmp, lipase elevated 71. UA negative, COVID negative   Imaging: CT head: no acute intracranial pathology, severe chronic microvascular disease   CXR: No acute infiltrate or effusions   EKG: NSR HR 80s, no acute ST or T wave changes   Medications:  none   Consults: none  (27 Sep 2020 01:39)                            11.5   5.09  )-----------( 242      ( 01 Oct 2020 05:50 )             36.0       10-01    141  |  105  |  18  ----------------------------<  95  4.4   |  26  |  0.71    Ca    8.8      01 Oct 2020 05:50      Vital Signs Last 24 Hrs  T(C): 36.9 (02 Oct 2020 06:05), Max: 36.9 (01 Oct 2020 16:10)  T(F): 98.5 (02 Oct 2020 06:05), Max: 98.5 (01 Oct 2020 16:10)  HR: 86 (02 Oct 2020 06:05) (86 - 87)  BP: 117/72 (02 Oct 2020 06:05) (104/65 - 117/72)  BP(mean): --  RR: 16 (02 Oct 2020 06:05) (16 - 18)  SpO2: 96% (02 Oct 2020 06:05) (96% - 98%)    MEDICATIONS  (STANDING):  enoxaparin Injectable 40 milliGRAM(s) SubCutaneous every 24 hours  multivitamin  Chewable 1 Tablet(s) Oral daily  QUEtiapine 50 milliGRAM(s) Oral every 12 hours    MEDICATIONS  (PRN):    Currently Undergoing Physical/ Occupational Therapy at bedside.    Functional Status Assessment:   10/1/2020      Cognitive/Perceptual/Neuro  Cognitive/Neuro/Behavioral [WDL Definition: Alert; opens eyes spontaneously; arouses to voice or touch; oriented x 4; follows commands; speech spontaneous, logical; purposeful motor response; behavior appropriate to situation]: WDL except  Level of Consciousness: confused  Orientation: disoriented to;  place;  time;  situation    Therapeutic Interventions      Bed Mobility  Bed Mobility Training Sit-to-Supine: supervsion;  supervision;  verbal cues;  nonverbal cues (demo/gestures)  Bed Mobility Training Limitations: decreased ability to use arms for pushing/pulling;  decreased ability to use legs for bridging/pushing;  impaired ability to control trunk for mobility;  decreased strength;  impaired postural control;  cognitive, decreased safety awareness    Sit-Stand Transfer Training  Transfer Training Sit-to-Stand Transfer: contact guard;  supervision;  1 person assist;  nonverbal cues (demo/gestures);  verbal cues;  weight-bearing as tolerated   none   Transfer Training Stand-to-Sit Transfer: supervision;  supervision;  verbal cues;  nonverbal cues (demo/gestures);  weight-bearing as tolerated   none   Sit-to-Stand Transfer Training Transfer Safety Analysis: decreased balance;  decreased proprioception;  decreased sequencing ability;  decreased strength;  impaired balance;  impaired postural control    Gait Training  Gait Training: contact guard;  supervision;  1 person assist;  verbal cues;  nonverbal cues (demo/gestures);  weight-bearing as tolerated   none ;  50 feet  Gait Analysis: 2-point gait   decreased hip/knee flexion;  decreased step length;  decreased strength;  impaired balance;  impaired postural control;  cognitive, decreased safety awareness            PM&R Impression: as above    Current Disposition Plan Recommendations:    subacute rehab placement

## 2020-10-02 NOTE — PROGRESS NOTE ADULT - SUBJECTIVE AND OBJECTIVE BOX
Patient seen and examined. Agree with resident's findings, assessment and plan. Agree with discharge diagnoses, as well as with plan of care and goals.  Time spent: 35 minutes for evaluation, plan of care, patient education, medication reconciliation, coordination of care, follow ups and transition to outpatient.     PE: Aox1 to person only, cooperative, otherwise unchanged exam no tremors, comfortable in bed    80F with dementia brought in for progression of disease with worsening agitation, now improved on medical therapy,qtc wnl. Advanced vascular dimentia on CT likely etiology for MS decline which has been worsening for several months per family. Unlikely rapidly progressive dimentia from CJD or autoimmune encephalitis. Pending [facility] placement    John Denise MD [phone]

## 2020-10-02 NOTE — PROGRESS NOTE ADULT - REASON FOR ADMISSION
failure to thrive

## 2020-10-02 NOTE — DISCHARGE NOTE NURSING/CASE MANAGEMENT/SOCIAL WORK - NSDCFUADDAPPT_GEN_ALL_CORE_FT
1. Please follow-up with the [facility] physician within 1 week of discharge from the hospital.     2. Please try to establish care with a primary care physician at [facility], located at [address] [place] [address] by calling [phone].

## 2020-10-02 NOTE — PROGRESS NOTE ADULT - NUTRITIONAL ASSESSMENT
This patient has been assessed with a concern for Malnutrition and has been determined to have a diagnosis/diagnoses of Moderate protein-calorie malnutrition and Underweight/BMI < 19.    This patient is being managed with:   Diet Mechanical Soft-  Dysphagia 2 Mechanical Soft Thin Liquids ([id number])  Supplement Feeding Modality:  Oral  Ensure Enlive Cans or Servings Per Day:  1       Frequency:  Three Times a day  Entered: Sep 28 2020  1:41PM    
This patient has been assessed with a concern for Malnutrition and has been determined to have a diagnosis/diagnoses of Moderate protein-calorie malnutrition and Underweight/BMI < 19.    This patient is being managed with:   Diet Mechanical Soft-  Dysphagia 2 Mechanical Soft Thin Liquids ([id number])  Supplement Feeding Modality:  Oral  Ensure Enlive Cans or Servings Per Day:  1       Frequency:  Three Times a day  Entered: Sep 28 2020  1:41PM    
This patient has been assessed with a concern for Malnutrition and has been determined to have a diagnosis/diagnoses of Moderate protein-calorie malnutrition and Underweight/BMI < 19.    This patient is being managed with:   Diet Soft-  Entered: Sep 27 2020  2:10PM    
This patient has been assessed with a concern for Malnutrition and has been determined to have a diagnosis/diagnoses of Moderate protein-calorie malnutrition and Underweight/BMI < 19.    This patient is being managed with:   Diet Mechanical Soft-  Dysphagia 2 Mechanical Soft Thin Liquids ([id number])  Supplement Feeding Modality:  Oral  Ensure Enlive Cans or Servings Per Day:  1       Frequency:  Three Times a day  Entered: Sep 28 2020  1:41PM    
This patient has been assessed with a concern for Malnutrition and has been determined to have a diagnosis/diagnoses of Moderate protein-calorie malnutrition and Underweight/BMI < 19.    This patient is being managed with:   Diet Mechanical Soft-  Dysphagia 2 Mechanical Soft Thin Liquids ([id number])  Supplement Feeding Modality:  Oral  Ensure Enlive Cans or Servings Per Day:  1       Frequency:  Three Times a day  Entered: Sep 28 2020  1:41PM    
This patient has been assessed with a concern for Malnutrition and has been determined to have a diagnosis/diagnoses of Moderate protein-calorie malnutrition and Underweight/BMI < 19.    This patient is being managed with:   Diet Mechanical Soft-  Dysphagia 2 Mechanical Soft Thin Liquids ([id number])  Supplement Feeding Modality:  Oral  Ensure Enlive Cans or Servings Per Day:  1       Frequency:  Three Times a day  Entered: Sep 28 2020  1:41PM    
This patient has been assessed with a concern for Malnutrition and has been determined to have a diagnosis/diagnoses of Moderate protein-calorie malnutrition and Underweight/BMI < 19.    This patient is being managed with:   Diet Mechanical Soft-  Dysphagia 2 Mechanical Soft Thin Liquids ([id number])  Supplement Feeding Modality:  Oral  Ensure Enlive Cans or Servings Per Day:  1       Frequency:  Three Times a day  Entered: Sep 28 2020  1:41PM    
This patient has been assessed with a concern for Malnutrition and has been determined to have a diagnosis/diagnoses of Moderate protein-calorie malnutrition and Underweight/BMI < 19.    This patient is being managed with:   Diet Soft-  Entered: Sep 27 2020  2:10PM

## 2020-10-08 DIAGNOSIS — R62.7 ADULT FAILURE TO THRIVE: ICD-10-CM

## 2020-10-08 DIAGNOSIS — Z91.81 HISTORY OF FALLING: ICD-10-CM

## 2020-10-08 DIAGNOSIS — E44.0 MODERATE PROTEIN-CALORIE MALNUTRITION: ICD-10-CM

## 2020-10-08 DIAGNOSIS — F01.51 VASCULAR DEMENTIA, UNSPECIFIED SEVERITY, WITH BEHAVIORAL DISTURBANCE: ICD-10-CM

## 2020-10-08 DIAGNOSIS — R41.82 ALTERED MENTAL STATUS, UNSPECIFIED: ICD-10-CM

## 2020-10-08 DIAGNOSIS — Z78.1 PHYSICAL RESTRAINT STATUS: ICD-10-CM

## 2020-10-08 DIAGNOSIS — R74.8 ABNORMAL LEVELS OF OTHER SERUM ENZYMES: ICD-10-CM

## 2020-10-21 PROCEDURE — 85730 THROMBOPLASTIN TIME PARTIAL: CPT

## 2020-10-21 PROCEDURE — 83690 ASSAY OF LIPASE: CPT

## 2020-10-21 PROCEDURE — 84484 ASSAY OF TROPONIN QUANT: CPT

## 2020-10-21 PROCEDURE — 96376 TX/PRO/DX INJ SAME DRUG ADON: CPT

## 2020-10-21 PROCEDURE — 80048 BASIC METABOLIC PNL TOTAL CA: CPT

## 2020-10-21 PROCEDURE — 82746 ASSAY OF FOLIC ACID SERUM: CPT

## 2020-10-21 PROCEDURE — 83735 ASSAY OF MAGNESIUM: CPT

## 2020-10-21 PROCEDURE — 70450 CT HEAD/BRAIN W/O DYE: CPT

## 2020-10-21 PROCEDURE — 97161 PT EVAL LOW COMPLEX 20 MIN: CPT

## 2020-10-21 PROCEDURE — 36415 COLL VENOUS BLD VENIPUNCTURE: CPT

## 2020-10-21 PROCEDURE — 71045 X-RAY EXAM CHEST 1 VIEW: CPT

## 2020-10-21 PROCEDURE — 92610 EVALUATE SWALLOWING FUNCTION: CPT

## 2020-10-21 PROCEDURE — 85025 COMPLETE CBC W/AUTO DIFF WBC: CPT

## 2020-10-21 PROCEDURE — 84100 ASSAY OF PHOSPHORUS: CPT

## 2020-10-21 PROCEDURE — 84443 ASSAY THYROID STIM HORMONE: CPT

## 2020-10-21 PROCEDURE — 93005 ELECTROCARDIOGRAM TRACING: CPT

## 2020-10-21 PROCEDURE — 82607 VITAMIN B-12: CPT

## 2020-10-21 PROCEDURE — 99285 EMERGENCY DEPT VISIT HI MDM: CPT | Mod: 25

## 2020-10-21 PROCEDURE — 86780 TREPONEMA PALLIDUM: CPT

## 2020-10-21 PROCEDURE — 81003 URINALYSIS AUTO W/O SCOPE: CPT

## 2020-10-21 PROCEDURE — 85610 PROTHROMBIN TIME: CPT

## 2020-10-21 PROCEDURE — 85027 COMPLETE CBC AUTOMATED: CPT

## 2020-10-21 PROCEDURE — 96374 THER/PROPH/DIAG INJ IV PUSH: CPT

## 2020-10-21 PROCEDURE — U0003: CPT

## 2020-10-21 PROCEDURE — 80053 COMPREHEN METABOLIC PANEL: CPT

## 2021-12-06 NOTE — ED ADULT NURSE NOTE - CAS EDP DISCH TYPE
Assumed care of patient at this time. Patient asleep with equal, unlabored respirations. Remains on pulse ox and BP monitors. Home

## 2022-07-23 NOTE — ED ADULT NURSE NOTE - NS ED NURSE PATIENT LEFT UNIT TIME
Problem: ANTEPARTUM  Goal: Maintain pregnancy as long as maternal and/or fetal condition is stable  Description: INTERVENTIONS:  - Maternal surveillance  - Fetal surveillance  - Monitor uterine activity  - Medications as ordered  - Bedrest  Outcome: Progressing     Problem: PAIN - ADULT  Goal: Verbalizes/displays adequate comfort level or baseline comfort level  Description: Interventions:  - Encourage patient to monitor pain and request assistance  - Assess pain using appropriate pain scale  - Administer analgesics based on type and severity of pain and evaluate response  - Implement non-pharmacological measures as appropriate and evaluate response  - Consider cultural and social influences on pain and pain management  - Notify physician/advanced practitioner if interventions unsuccessful or patient reports new pain  Outcome: Progressing     Problem: INFECTION - ADULT  Goal: Absence or prevention of progression during hospitalization  Description: INTERVENTIONS:  - Assess and monitor for signs and symptoms of infection  - Monitor lab/diagnostic results  - Monitor all insertion sites, i e  indwelling lines, tubes, and drains  - Monitor endotracheal if appropriate and nasal secretions for changes in amount and color  - [place] appropriate cooling/warming therapies per order  - Administer medications as ordered  - Instruct and encourage patient and family to use good hand hygiene technique  - Identify and instruct in appropriate isolation precautions for identified infection/condition  Outcome: Progressing  Goal: Absence of fever/infection during neutropenic period  Description: INTERVENTIONS:  - Monitor WBC    Outcome: Progressing     Problem: SAFETY ADULT  Goal: Patient will remain free of falls  Description: INTERVENTIONS:  - Educate patient/family on patient safety including physical limitations  - Instruct patient to call for assistance with activity   - Consult OT/PT to assist with strengthening/mobility   - Keep Call bell within reach  - Keep bed low and locked with side rails adjusted as appropriate  - Keep care items and personal belongings within reach  - Initiate and maintain comfort rounds  - Make Fall Risk Sign visible to staff  - Offer Toileting every  Hours, in advance of need  - Initiate/Maintain alarm  - Obtain necessary fall risk management equipment:   - Apply yellow socks and bracelet for high fall risk patients  - Consider moving patient to room near nurses station  Outcome: Progressing  Goal: Maintain or return to baseline ADL function  Description: INTERVENTIONS:  -  Assess patient's ability to carry out ADLs; assess patient's baseline for ADL function and identify physical deficits which impact ability to perform ADLs (bathing, care of mouth/teeth, toileting, grooming, dressing, etc )  - Assess/evaluate cause of self-care deficits   - Assess range of motion  - Assess patient's mobility; develop plan if impaired  - Assess patient's need for assistive devices and provide as appropriate  - Encourage maximum independence but intervene and supervise when necessary  - Involve family in performance of ADLs  - Assess for home care needs following discharge   - Consider OT consult to assist with ADL evaluation and planning for discharge  - Provide patient education as appropriate  Outcome: Progressing  Goal: Maintains/Returns to pre admission functional level  Description: INTERVENTIONS:  - Perform BMAT or MOVE assessment daily    - Set and communicate daily mobility goal to care team and patient/family/caregiver  - Collaborate with rehabilitation services on mobility goals if consulted  - Perform Range of Motion  times a day  - Reposition patient every  hours    - Dangle patient  times a day  - Stand patient  times a day  - Ambulate patient  times a day  - Out of bed to chair  times a day   - Out of bed for meals  times a day  - Out of bed for toileting  - Record patient progress and toleration of activity level   Outcome: Progressing     Problem: Knowledge Deficit  Goal: Patient/family/caregiver demonstrates understanding of disease process, treatment plan, medications, and discharge instructions  Description: Complete learning assessment and assess knowledge base    Interventions:  - Provide teaching at level of understanding  - Provide teaching via preferred learning methods  Outcome: Progressing     Problem: DISCHARGE PLANNING  Goal: Discharge to home or other facility with appropriate resources  Description: INTERVENTIONS:  - Identify barriers to discharge w/patient and caregiver  - Arrange for needed discharge resources and transportation as appropriate  - Identify discharge learning needs (meds, wound care, etc )  - Arrange for interpretive services to assist at discharge as needed  - Refer to Case Management Department for coordinating discharge planning if the patient needs post-hospital services based on physician/advanced practitioner order or complex needs related to functional status, cognitive ability, or social support system  Outcome: Progressing 02:08

## 2023-11-20 NOTE — PROGRESS NOTE ADULT - PROBLEM SELECTOR PLAN 2
Dialysis Time Out  To be done by RN and tech or 2 RNs  Staff Names Salud1 Juan R Kochjacques S RN     [x]  Identity of the patient using 2 patient identifiers  [x]  Consent for treatment  [x]  Equipment-proper machine and dialyzer  [x]  B-Hep B status  [x]  Orders- to include bath, blood flow, dialyzer, time and fluid removal  [x]  Access-Correct site and in working order  [x]  Time for patient to ask questions. Pt with longstanding history of dementia, in the ED, was very agitated and incoherent.   - RPR neg  - B12, folate, TSH wnl  - Psych on consult, appreciate further recs  - Seroquel 50mg qHS  - olanzipine 2.5mg q6 hours prn for agitation Pt with longstanding history of dementia, in the ED, was very agitated and incoherent.   - RPR neg  - B12, folate, TSH wnl  - Psych on consult, appreciate further recs  - Seroquel 50mg qHS  - Discontinued olanzapine this AM, ordered EKG to f/u QTc before reordering if needed.  - Patient on enhanced Observation now, has been noncombative overnight